# Patient Record
Sex: FEMALE | Race: ASIAN | NOT HISPANIC OR LATINO | URBAN - METROPOLITAN AREA
[De-identification: names, ages, dates, MRNs, and addresses within clinical notes are randomized per-mention and may not be internally consistent; named-entity substitution may affect disease eponyms.]

---

## 2017-06-13 ENCOUNTER — OUTPATIENT (OUTPATIENT)
Dept: OUTPATIENT SERVICES | Facility: HOSPITAL | Age: 57
LOS: 1 days | End: 2017-06-13
Payer: COMMERCIAL

## 2017-06-13 ENCOUNTER — APPOINTMENT (OUTPATIENT)
Dept: RADIOLOGY | Facility: HOSPITAL | Age: 57
End: 2017-06-13

## 2017-06-13 DIAGNOSIS — R76.11 NONSPECIFIC REACTION TO TUBERCULIN SKIN TEST WITHOUT ACTIVE TUBERCULOSIS: ICD-10-CM

## 2017-06-13 PROCEDURE — 71010: CPT | Mod: 26

## 2018-07-30 ENCOUNTER — TRANSCRIPTION ENCOUNTER (OUTPATIENT)
Age: 58
End: 2018-07-30

## 2018-08-22 ENCOUNTER — APPOINTMENT (OUTPATIENT)
Dept: INTERNAL MEDICINE | Facility: CLINIC | Age: 58
End: 2018-08-22
Payer: COMMERCIAL

## 2018-08-22 VITALS
TEMPERATURE: 98.1 F | BODY MASS INDEX: 18.89 KG/M2 | SYSTOLIC BLOOD PRESSURE: 130 MMHG | DIASTOLIC BLOOD PRESSURE: 80 MMHG | HEIGHT: 58 IN | WEIGHT: 90 LBS | HEART RATE: 74 BPM | OXYGEN SATURATION: 99 %

## 2018-08-22 DIAGNOSIS — Z86.19 PERSONAL HISTORY OF OTHER INFECTIOUS AND PARASITIC DISEASES: ICD-10-CM

## 2018-08-22 DIAGNOSIS — Z78.9 OTHER SPECIFIED HEALTH STATUS: ICD-10-CM

## 2018-08-22 DIAGNOSIS — Z80.42 FAMILY HISTORY OF MALIGNANT NEOPLASM OF PROSTATE: ICD-10-CM

## 2018-08-22 DIAGNOSIS — Z82.3 FAMILY HISTORY OF STROKE: ICD-10-CM

## 2018-08-22 DIAGNOSIS — Z80.0 FAMILY HISTORY OF MALIGNANT NEOPLASM OF DIGESTIVE ORGANS: ICD-10-CM

## 2018-08-22 DIAGNOSIS — Z87.19 PERSONAL HISTORY OF OTHER DISEASES OF THE DIGESTIVE SYSTEM: ICD-10-CM

## 2018-08-22 PROCEDURE — 93000 ELECTROCARDIOGRAM COMPLETE: CPT

## 2018-08-22 PROCEDURE — 99205 OFFICE O/P NEW HI 60 MIN: CPT

## 2018-08-22 PROCEDURE — 82270 OCCULT BLOOD FECES: CPT

## 2018-09-07 ENCOUNTER — TRANSCRIPTION ENCOUNTER (OUTPATIENT)
Age: 58
End: 2018-09-07

## 2018-09-21 LAB
25(OH)D3 SERPL-MCNC: 35.9 NG/ML
ALBUMIN SERPL ELPH-MCNC: 4.9 G/DL
ALP BLD-CCNC: 63 U/L
ALT SERPL-CCNC: 58 U/L
ANION GAP SERPL CALC-SCNC: 14 MMOL/L
APPEARANCE: CLEAR
AST SERPL-CCNC: 47 U/L
BASOPHILS # BLD AUTO: 0.03 K/UL
BASOPHILS NFR BLD AUTO: 0.7 %
BILIRUB SERPL-MCNC: 0.5 MG/DL
BILIRUBIN URINE: NEGATIVE
BLOOD URINE: NEGATIVE
BUN SERPL-MCNC: 11 MG/DL
CALCIUM SERPL-MCNC: 9.8 MG/DL
CHLORIDE SERPL-SCNC: 102 MMOL/L
CHOLEST SERPL-MCNC: 206 MG/DL
CHOLEST/HDLC SERPL: 3.5 RATIO
CO2 SERPL-SCNC: 25 MMOL/L
COLOR: YELLOW
CREAT SERPL-MCNC: 0.61 MG/DL
EOSINOPHIL # BLD AUTO: 0.12 K/UL
EOSINOPHIL NFR BLD AUTO: 2.6 %
GLUCOSE QUALITATIVE U: NEGATIVE MG/DL
GLUCOSE SERPL-MCNC: 99 MG/DL
HBV CORE IGG+IGM SER QL: NONREACTIVE
HBV SURFACE AB SER QL: REACTIVE
HBV SURFACE AG SER QL: NONREACTIVE
HCT VFR BLD CALC: 38 %
HCV AB SER QL: NONREACTIVE
HCV S/CO RATIO: 0.13 S/CO
HDLC SERPL-MCNC: 59 MG/DL
HEPATITIS A IGG ANTIBODY: NONREACTIVE
HGB BLD-MCNC: 12.4 G/DL
HIV1+2 AB SPEC QL IA.RAPID: NONREACTIVE
IMM GRANULOCYTES NFR BLD AUTO: 0 %
KETONES URINE: NEGATIVE
LDLC SERPL CALC-MCNC: 129 MG/DL
LEUKOCYTE ESTERASE URINE: NEGATIVE
LYMPHOCYTES # BLD AUTO: 1.72 K/UL
LYMPHOCYTES NFR BLD AUTO: 37.7 %
MAN DIFF?: NORMAL
MCHC RBC-ENTMCNC: 30.2 PG
MCHC RBC-ENTMCNC: 32.6 GM/DL
MCV RBC AUTO: 92.7 FL
MONOCYTES # BLD AUTO: 0.41 K/UL
MONOCYTES NFR BLD AUTO: 9 %
NEUTROPHILS # BLD AUTO: 2.28 K/UL
NEUTROPHILS NFR BLD AUTO: 50 %
NITRITE URINE: NEGATIVE
PH URINE: 6.5
PLATELET # BLD AUTO: 346 K/UL
POTASSIUM SERPL-SCNC: 3.8 MMOL/L
PROT SERPL-MCNC: 7.4 G/DL
PROTEIN URINE: NEGATIVE MG/DL
RBC # BLD: 4.1 M/UL
RBC # FLD: 13.6 %
SODIUM SERPL-SCNC: 141 MMOL/L
SPECIFIC GRAVITY URINE: 1.02
T PALLIDUM AB SER QL IA: NEGATIVE
TRIGL SERPL-MCNC: 89 MG/DL
UROBILINOGEN URINE: NEGATIVE MG/DL
VZV AB TITR SER: POSITIVE
VZV IGG SER IF-ACNC: 2212 INDEX
WBC # FLD AUTO: 4.56 K/UL

## 2018-09-23 ENCOUNTER — FORM ENCOUNTER (OUTPATIENT)
Age: 58
End: 2018-09-23

## 2018-09-23 LAB
M TB IFN-G BLD-IMP: POSITIVE
QUANTIFERON TB PLUS MITOGEN MINUS NIL: 8.54 IU/ML
QUANTIFERON TB PLUS NIL: 0.02 IU/ML
QUANTIFERON TB PLUS TB1 MINUS NIL: 0.54 IU/ML
QUANTIFERON TB PLUS TB2 MINUS NIL: 0.38 IU/ML

## 2018-09-24 ENCOUNTER — OUTPATIENT (OUTPATIENT)
Dept: OUTPATIENT SERVICES | Facility: HOSPITAL | Age: 58
LOS: 1 days | End: 2018-09-24
Payer: COMMERCIAL

## 2018-09-24 ENCOUNTER — APPOINTMENT (OUTPATIENT)
Dept: RADIOLOGY | Facility: CLINIC | Age: 58
End: 2018-09-24
Payer: COMMERCIAL

## 2018-09-24 ENCOUNTER — APPOINTMENT (OUTPATIENT)
Dept: MAMMOGRAPHY | Facility: CLINIC | Age: 58
End: 2018-09-24
Payer: COMMERCIAL

## 2018-09-24 PROCEDURE — 77080 DXA BONE DENSITY AXIAL: CPT | Mod: 26

## 2018-09-24 PROCEDURE — 77063 BREAST TOMOSYNTHESIS BI: CPT

## 2018-09-24 PROCEDURE — 77080 DXA BONE DENSITY AXIAL: CPT

## 2018-09-24 PROCEDURE — 77067 SCR MAMMO BI INCL CAD: CPT | Mod: 26

## 2018-09-24 PROCEDURE — 77063 BREAST TOMOSYNTHESIS BI: CPT | Mod: 26

## 2018-09-24 PROCEDURE — 77067 SCR MAMMO BI INCL CAD: CPT

## 2018-10-03 ENCOUNTER — FORM ENCOUNTER (OUTPATIENT)
Age: 58
End: 2018-10-03

## 2018-10-04 ENCOUNTER — APPOINTMENT (OUTPATIENT)
Dept: ULTRASOUND IMAGING | Facility: HOSPITAL | Age: 58
End: 2018-10-04
Payer: COMMERCIAL

## 2018-10-04 ENCOUNTER — OUTPATIENT (OUTPATIENT)
Dept: OUTPATIENT SERVICES | Facility: HOSPITAL | Age: 58
LOS: 1 days | End: 2018-10-04
Payer: COMMERCIAL

## 2018-10-04 PROCEDURE — 76700 US EXAM ABDOM COMPLETE: CPT

## 2018-10-04 PROCEDURE — 76700 US EXAM ABDOM COMPLETE: CPT | Mod: 26

## 2018-10-07 LAB
A1AT SERPL-MCNC: 106 MG/DL
ANA SER IF-ACNC: NEGATIVE
CERULOPLASMIN SERPL-MCNC: 23 MG/DL
COPPER SERPL-MCNC: 111 UG/DL
COPPER UR-MCNC: 12 MCG/G CR
DSDNA AB SER-ACNC: <12 IU/ML
ENA RNP AB SER IA-ACNC: <0.2 AL
ENA SM AB SER IA-ACNC: <0.2 AL
FERRITIN SERPL-MCNC: 334 NG/ML
LKM AB SER QL IF: 1.2 UNITS
MITOCHONDRIA AB SER IF-ACNC: NORMAL
SMOOTH MUSCLE AB SER QL IF: NORMAL

## 2018-10-08 ENCOUNTER — APPOINTMENT (OUTPATIENT)
Dept: INTERNAL MEDICINE | Facility: CLINIC | Age: 58
End: 2018-10-08
Payer: COMMERCIAL

## 2018-10-08 VITALS
SYSTOLIC BLOOD PRESSURE: 144 MMHG | HEART RATE: 67 BPM | WEIGHT: 91 LBS | HEIGHT: 58 IN | TEMPERATURE: 98 F | BODY MASS INDEX: 19.1 KG/M2 | DIASTOLIC BLOOD PRESSURE: 87 MMHG | OXYGEN SATURATION: 99 %

## 2018-10-08 PROCEDURE — 99215 OFFICE O/P EST HI 40 MIN: CPT

## 2018-10-08 NOTE — PHYSICAL EXAM
[Well Developed] : well developed [Well Nourished] : well nourished [Normal Voice Quality] : was normal [Normal Verbal Skills] : a deficiency in verbal communication skills was noted [Normal Nonverbal Skills] : deficient nonverbal communication skills were noted [Conjunctiva] : the conjunctiva were normal in both eyes [PERRL] : pupils were equal in size, round, and reactive to light [EOM Intact] : extraocular movements were intact [Normal Appearance] : was normal in appearance [Neck Supple] : was supple [No Tracheal Deviation] : the trachea was midline [Enlarged Diffusely] : was not enlarged [JVP Elevated ___cm] : the JVP was not elevated [Rate ___] : at [unfilled] breaths per minute [Normal Rhythm/Effort] : normal respiratory rhythm and effort [Clear Bilaterally] : the lungs were clear to auscultation bilaterally [Normal to Percussion] : the lungs were normal to percussion [Heart Rate ___] : [unfilled] bpm [Rt] : no varicose veins of the right leg [Lt] : no varicose veins of the left leg [Normal Rate] : normal [Normal S1] : normal S1 [Normal S2] : normal S2 [S3] : no S3 [S4] : no S4 [No Murmur] : no murmurs heard [No Pitting Edema] : no pitting edema present [Right Carotid Bruit] : no bruit heard over the right carotid [Left Carotid Bruit] : no bruit heard over the left carotid [Right Femoral Bruit] : no bruit heard over the right femoral artery [Left Femoral Bruit] : no bruit heard over the left femoral artery [2+] : left 2+ [No Abnormalities] : the abdominal aorta was not enlarged and no bruit was heard [Soft, Nontender] : the abdomen was soft and nontender [No Mass] : no masses were palpated [No HSM] : no hepatosplenomegaly noted [None] : no CVA tenderness [Cervical Lymph Nodes Enlarged Posterior Bilaterally] : nodes not enlarged [Supraclavicular Lymph Nodes Enlarged Bilaterally] : nodes not enlarged [Axillary Lymph Nodes Enlarged Bilaterally] : nodes not enlarged [Inguinal Lymph Nodes Enlarged Bilaterally] : nodes not enlarged [Normal Kyphosis] : normal kyphosis [No Visual Abnormalities] : no visible abnormalities [Normal Lordosis] : normal lordosis [No Scoliosis] : no scoliosis [No Tenderness to Palpation] : no spine tenderness on palpation [No Masses] : no masses [Full ROM] : full ROM [No Pain with ROM] : no pain with motion in any direction [Intact] : all reflexes within normal limits bilaterally [Normal Station and Gait] : the gait and station were normal [Normal Motor Tone] : the muscle tone was normal [Involuntary Movements] : no involuntary movements were seen [Abnormal Color] : normal color and pigmentation [Complexion Medium] : medium complexion [Skin Lesions 1] : no skin lesions were observed [Skin Turgor Decreased] : normal skin turgor [Normal] : normal texture and mobility [Normal Gait] : normal gait [Coordination Grossly Intact] : coordination grossly intact [No Focal Deficits] : no focal deficits [Deep Tendon Reflexes (DTR)] : deep tendon reflexes were 2+ and symmetric [Normal Mental Status] : the patient's orientation, memory, attention, language and fund of knowledge were normal [Appropriate] : appropriate [Impaired judgment] : intact judgment [Impaired Insight] : intact insight

## 2018-10-08 NOTE — ASSESSMENT
[High Risk Surgery - Intraperitoneal, Intrathoracic or Supringuinal Vascular Procedures] : High Risk Surgery - Intraperitoneal, Intrathoracic or Supringuinal Vascular Procedures - No (0) [Ischemic Heart Disease] : Ischemic Heart Disease - No (0) [Congestive Heart Failure] : Congestive Heart Failure - No (0) [Prior Cerebrovascular Accident or TIA] : Prior Cerebrovascular Accident or TIA - No (0) [Creatinine >= 2mg/dL (1 Point)] : Creatinine >= 2mg/dL - No (0) [Insulin-dependent Diabetic (1 Point)] : Insulin-dependent Diabetic - No (0) [0] : 0 , RCRI Class: I, Risk of Post-Op Cardiac Complications: 0.4%, Procedure Risk: Low-Risk [FreeTextEntry4] : Pt is having a low risk procedure and is low risks for cardiac adverse outcome and cri is 0.4% .  she was explained the procedures colonoscopy, anesthesia   risks and complications alternatives , prep and post procedure care.  I have answered all her questions.  She is medically cleared for  procedure .  \par elevated ferritin pt will be tested for hemochromatosis gene mutation and advised further when evalutated.

## 2018-10-08 NOTE — PLAN
[FreeTextEntry1] : gall bladder polypsGallbladder polyps are outgrowths of the gallbladder mucosal wall. They are usually found incidentally on ultrasonography or after cholecystectomy. When detected on ultrasonography, their clinical significance relates largely to their malignant potential. The majority of these lesions are not neoplastic but are hyperplastic or represent lipid deposits (cholesterolosis). Imaging studies alone are insufficiently specific to exclude the possibility of gallbladder carcinoma or premalignant adenomas. Furthermore, even benign lesions can occasionally lead to symptoms similar to those caused by gallbladder stones.\par \par While the widespread use of ultrasonography has made the diagnosis of polypoid lesions of the gallbladder increasingly frequent, optimal strategies for evaluating these lesions have not been fully established. This topic will review the clinical significance and differential diagnosis of gallbladder polyps, and will provide a practical approach to their management. Gallbladder cancer is discussed in detail elsewhere. (See "Gallbladder cancer: Epidemiology, risk factors, clinical features, and diagnosis".)\par \par EPIDEMIOLOGY — Gallbladder polyps have been observed in 0.004 to 13.8 percent of resected gallbladders [1], and in 1.5 to 4.5 percent of gallbladders assessed by ultrasonography [2,3]. In one report, no association was observed between the presence of polyps and the patient's age, sex, weight, number of pregnancies, use of exogenous female hormones, or any other risk factors that are generally believed to be associated with gallstones [3]. Gallbladder polyps have only rarely been described in children, in whom they occur either as a primary disorder or in association with other conditions, including metachromatic leukodystrophy, Peutz-Jeghers syndrome, or pancreatobiliary malunion [4]. (See "Gallstones: Epidemiology, risk factors and prevention".)\par \par CLASSIFICATION — The classification of gallbladder polyps was first proposed in 1970 based upon a review of 180 benign tumors [5]. As a general rule, polypoid lesions can be categorized as benign or malignant (table 1) [6,7]. Benign lesions have been further subdivided into neoplastic or non-neoplastic:\par \par ?The most common benign neoplastic lesion is an adenoma. Benign mesodermal tumors such as leiomyomas and lipomas are rare.\par \par \par ?The most common benign non-neoplastic lesions (pseudotumors) are cholesterol polyps (the presence of which is referred to as "cholesterolosis"), followed by adenomyomas (the presence of which is referred to as "adenomyomatosis"), and inflammatory polyps [1,5]. Cholesterolosis and adenomyomatosis are mucosal abnormalities of the gallbladder. They have been referred to as "hyperplastic cholecystosis," a term introduced in 1960 to differentiate them from inflammatory conditions such as acute cholecystitis, since they lack inflammatory features but exhibit features of hyperplasia [8]. Some data suggest that adenomyomatosis may be associated with an increased risk of gallbladder cancer. (See 'Adenomyomatosis' below.)\par \par \par ?The most common malignant lesion in the gallbladder is adenocarcinoma. Gallbladder adenocarcinomas are much more common than gallbladder adenomas, in contrast to the colon where adenomas are much more common than adenocarcinomas. Squamous cell carcinomas, mucinous cystadenomas, and adenoacanthomas of the gallbladder are rare.\par \par \par CHOLESTEROLOSIS AND CHOLESTEROL POLYPS — Cholesterolosis has been recognized since 1857, when Virchow described it in a report on the role of the gallbladder in fat metabolism [9]. Cholesterolosis is characterized by the accumulation of lipids in the mucosa of the gallbladder wall. It is a benign condition that is usually diagnosed incidentally during cholecystectomy or on ultrasonography. However, in some patients it can lead to symptoms and complications similar to those caused by gallstones.\par \par Epidemiology of cholesterolosis — Cholesterolosis is common; its prevalence in surgical studies varies from 9 to 26 percent [9]. A large autopsy series of 1300 cases found the prevalence to be 12 percent [10]. Cholesterolosis in association with gallstones is by far the most common pathologic finding in the gallbladder [3]. Most surgical series suggest risk factors that are similar to those for gallstone formation. However, as noted above, an ultrasound study showed no association with any of the known risk factors for gallstones [3]. (See 'Epidemiology' above.)\par \par Similarly, while gallstone disease is known to be more common in women, an autopsy series found the prevalence of cholesterolosis to be equal between men and women [10]. These contradictory observations may be explained by the observation that surgical series generally focus on gallbladders from patients who were symptomatic, which is not necessarily the case in autopsy or ultrasonographic studies.\par \par Pathology of cholesterolosis — Cholesterolosis results from abnormal deposits of triglycerides, cholesterol precursors, and cholesterol esters into the gallbladder mucosa. The lipid accumulation creates yellow deposits that are generally visible to the naked eye. The appearance of the yellow deposits on a background of hyperemic mucosa led to the description of this finding as a "strawberry gallbladder" (picture 1).\par \par The main microscopic feature is the presence of fat laden macrophages within elongated villi. Most of the lipid in the cytoplasm of the macrophages is in the form of liquid crystals, which are birefringent under polarized light microscopy, giving the macrophages a characteristic foamy appearance (picture 2).\par \par The hyperplastic villus is filled and distended with these cells, creating the small yellow nodules under the epithelium. In about two-thirds of cases, these nodules are less than 1 mm in diameter, which gives the mucosa the coarse and granular appearance that is characteristic of the diffuse or planar type of cholesterolosis. The nodules in the remaining one-third of cases are larger and polypoid in appearance (polypoid form) [10].\par \par In the polypoid form, the deposits give rise to solitary or multiple cholesterol polyps that are attached to the underlying mucosa with a fragile epithelial pedicle, the core of which is composed of lipid filled macrophages. These polyps can break off, leading to complications similar to those caused by small gallstones including biliary pain, pancreatitis, and obstructive jaundice.\par \par ADENOMYOMATOSIS — Adenomyomatosis is an abnormality of the gallbladder characterized by overgrowth of the mucosa, thickening of the muscle wall, and intramural diverticula. Despite the name, this condition does not involve any adenomatous changes in the gallbladder epithelium. While generally not considered to be a premalignant condition, adenomyomatosis is sometimes encountered in gallbladders resected for cancer, which has led to the suggestion that it is a premalignant condition [11]. However, the association and its magnitude remain unclear (see 'Adenomyomatosis and gallbladder cancer' below). There is, however, a clear association of adenomyomatosis with cholelithiasis, particularly with the segmental type, which was reported in 89 percent of patients with segmental adenomyomatosis in one series [12].\par \par Epidemiology of adenomyomatosis — Adenomyomatosis of the gallbladder is less common than cholesterolosis in most reports [13], with some exceptions [14]. In one report, for example, only 103 cases of adenomyomatosis were found in over 10,000 cholecystectomies (1 percent) [13]. This is compared with the much higher prevalence of cholesterolosis (9 to 26 percent) [9]. Adenomyomatosis is more common in women.\par \par Pathology of adenomyomatosis — The abnormality can be diffuse, segmental (annular), or localized to the fundus of the gallbladder.\par \par ?Diffuse adenomyomatosis causes thickening and irregularity of the mucosal surface and the muscle coat leading to cystic-like structures in the gallbladder wall or polypoid projections from the mucosa of the gallbladder. In the early phases, the intramural extension of the epithelium creates tubules and crypts in the lamina propria that accumulate mucous. Fluid filled mucosal pockets eventually herniate into the wall of the gallbladder and through the muscularis propria, forming cystic structures that are visible on gross inspection as pools of bile in the gallbladder wall (Rokitansky-Aschoff sinuses). The point of herniation may appear sealed due to hypertrophy of the muscularis.\par \par \par ?In the segmental type, a circumferential ring divides the gallbladder into separate interconnected compartments.\par \par \par ?In the localized type, the cystic structure forms a nodule, usually in the fundus, that projects into the lumen giving the appearance of a polyp on ultrasonography [11,14-16]. The muscle layer in the involved area is usually thickened to three to five times its usual thickness [11,15].\par \par \par Adenomyomatosis and gallbladder cancer — While generally not considered to be a premalignant condition, there have been several reports of gallbladder cancer arising in an area of segmental or fundal adenomyomatosis, but this association has not been a consistent finding in published series:\par \par In a series of 3197 consecutive, unselected cholecystectomies, gallbladder cancer was found in 6.4 percent of patients with segmental adenomyomatosis compared with 3.1 percent of all other cholecystectomies [17]. \par \par ?In a similar retrospective series of 4560 patients, gallbladder cancer was identified in 6.6 percent of patients with segmental adenomyomatosis versus 4.3 percent in those without (p = 0.049) [18]. \par \par \par ?On the other hand, in a series of 4704 cholecystectomies, adenomyomatosis was found in 2.4 percent, but no cancer was identified in any of these patients' resected gallbladders [19]. \par \par \par ?In a series of 30 patients with adenomyomatosis of the gallbladder identified over seven years and comprising 3.3 percent of all cholecystectomies in that center, there were no cases of gallbladder cancer in any of these patients, but there was an association with gallstones [20]. \par \par \par ?In a series of 1099 patients who underwent cholecystectomy, adenomyomatosis was found in 14.2 percent with a strong association with gallstones but with no cases of cancer diagnosed [12]. \par \par \par ?One series suggested that gallbladder cancer in patients with adenomyomatosis may be associated with more advanced cancer; in a series of 97 patients with gallbladder cancer, 25 percent were positive for adenomyomatosis, and in these patients there was an increased risk for a more advanced T stage, lymph node, and distant metastasis. The authors suggested that the presence of adenomyomatosis may prevent early detection of gallbladder cancer [21].\par \par \par At this time, there is no conclusive evidence that the presence of adenomyomatosis increases the risk of gallbladder cancer. If the risk is increased, the magnitude of the increased risk appears to be small (probably not more than twice the average risk). Finally, there is evidence that the presence of adenomyomatosis is associated with more advanced gallbladder cancer, possibly because its presence prevents early diagnosis of cancer on imaging studies.\par \par INFLAMMATORY POLYPS — Inflammatory polyps are the least common of the non-neoplastic polyps. They appear as either sessile or pedunculated on an ultrasonographic examination and are composed of granulation and fibrous tissue with plasma cells and lymphocytes. Polyps are usually 5 to 10 mm in diameter, although inflammatory polyps larger than 1 cm have been described. These large polyps can be confused with gallbladder carcinoma [22].\par \par ADENOMAS — Adenomatous polyps of the gallbladder are the most common benign tumors of the gallbladder but, unlike their colonic counterpart, are rare. Although the true incidence is unknown, in most series it is less than 0.5 percent. In one study, nine adenomatous polyps were identified among 2145 cholecystectomies (0.4 percent) [23].\par \par Pathology of adenomas — Adenomas of the gallbladder are benign epithelial tumors composed of cells resembling biliary tract epithelium. These lesions are classified into papillary and non-papillary types on histology [5]. A classification similar to that of intestinal polyps has been proposed in which the adenomas are divided into tubular, papillary, and mixed [24].\par \par The frequency of adenomas progressing to adenocarcinoma is unknown. In a series from [place], 18 patients with adenomas were identified among 1605 cholecystectomies. Of these, seven adenomas contained foci of carcinoma [25]. Similar to intestinal adenomas, the risk of malignancy is related to the size of the polyp. In the [place] series, none of the polyps less than 12 mm contained foci of malignancy.\par \par In another review from 15 [place] institutions, a 6 percent incidence of carcinoma was reported in lesions less than 1.0 cm, while the incidence was 37.5 percent in adenomas that were 1.0 to 2.0 cm [26]. In a third report in which nine adenomas were diagnosed among 2145 cholecystectomies, all of the nine polyps were less than 1 cm and none showed evidence for malignant changes [23]. (See "Gallbladder cancer: Epidemiology, risk factors, clinical features, and diagnosis", section on 'Molecular pathogenesis'.)\par \par MISCELLANEOUS POLYPS — Other benign neoplasms of the gallbladder, such as fibromas, lipomas, and leiomyomas, are extremely rare. The natural history of these polyps is not well defined, but is probably similar to their counterparts in other regions of the gastrointestinal tract.\par \par CLINICAL FEATURES — Polyps of the gallbladder are typically incidental findings detected during radiologic imaging of the abdomen. Their significance is related to their potential for malignancy. (See "Gallbladder cancer: Epidemiology, risk factors, clinical features, and diagnosis".)\par \par Regardless of their type or etiology, gallbladder polyps can be associated with biliary pain. Proposed mechanisms of pain include prolapse of the polyp into Zack's pouch, which, if it occurs during gallbladder ejection, can lead to biliary-type pain that subsides upon spontaneous reduction [27]. Another possible mechanism is that a detached portion of a polyp lying free in the gallbladder lumen can obstruct the cystic duct in much the same way a gallstone would, leading to biliary colic or cholecystitis [28]. The detached portion can also obstruct the common bile duct, leading to obstructive jaundice [29] and pancreatitis [30].\par \par In a review of 3,797 cholecystectomies, 55 cases of gallbladder cholesterolosis without cholelithiasis were identified. Twenty-seven of these patients presented with recurrent attacks of acute pancreatitis, which disappeared after cholecystectomy. The gallbladders had jerry cholesterolosis with a polypoid appearance. The authors postulated that detached cholesterol polyps temporarily impact at the sphincter of Oddi, leading to pancreatitis [30].\par \par In addition to biliary pain, cholesterolosis and adenomyomatosis have also been associated with chronic dyspeptic abdominal pain. In a study of 269 patients who underwent cholecystectomy and were found to have cholesterolosis, 96 percent had abdominal pain that was described as severe and in a majority, the pain had persisted for more than two years. Other symptoms reported in the same study were nausea and vomiting (61 percent) and dyspepsia (60 percent). Most of these symptoms resolved after cholecystectomy [31]. In another study, 35 of 55 patients with chronic abdominal pain underwent cholecystectomy; cholesterolosis was found in 20 patients, 19 of whom had improvement in symptoms [32].\par \par It has been suggested that polyps, cholesterolosis, and adenomyomatosis can lead to poor gallbladder emptying and compartmentalization that may be responsible for dyspeptic symptoms. However, the mechanism of these symptoms remains unclear since these observations have not been confirmed in other studies and the results of surgery are variable. Thus, it remains debatable whether these lesions can lead to chronic dyspeptic pain.\par \par RISK OF MALIGNANCY — The most useful predictive feature for malignancy is the size of the polyp. Polyps larger than 2 cm are almost always malignant and, in many cases, the cancer is advanced. Polyps 1 to 2 cm in size should be regarded as possibly malignant. Several pathologic studies support this, with the incidence of carcinoma being 43 to 77 percent in polyps larger than 1 cm [33] and 100 percent in polyps larger than 2 cm [34]. Age is another risk factor. Several studies demonstrated that age older than 50 to 60 years is associated with a higher risk for malignant polyps [35]. (See "Gallbladder cancer: Epidemiology, risk factors, clinical features, and diagnosis".)\par \par DIAGNOSIS — Advances in diagnostic imaging have resulted in improved sensitivity for the detection of gallbladder polyps. Although none of the available modalities can reliably predict polyp type, histology, or the presence of malignancy, a combination of features seen on an ultrasound, computed tomographic (CT) scan, and endoscopic ultrasound (EUS) can provide valuable information.\par \par Ultrasonography\par \par Conventional ultrasonography — Polyps are easily identified on ultrasonography as single or multiple echogenic foci. They can be easily differentiated from gallstones because they are fixed and do not move when the patient is rolled from one side to another. In addition, they do not cast a shadow (image 1). Ultrasonography can delineate other useful distinguishing characteristics in the appearance of polyps, including echogenicity, surface architecture, and the presence or absence of a pedicle.\par \par The following characteristics have been noted [36]:\par \par ?Cholesterol polyps are usually multiple, homogeneous, and pedunculated polypoid lesions that are more echogenic than the liver parenchyma (image 2). They may or may not contain hyperechoic spots and have a mulberry-like surface. Cholesterol polyps are usually smaller than 1 cm.\par \par \par ?Adenomas are homogeneous, are isoechoic with the liver parenchyma, have a smooth surface, and usually do not have a pedicle (image 3).\par \par \par ?Adenocarcinomas are homogeneous or heterogeneous polypoid structures that are usually isoechoic with the liver parenchyma and exhibit a mulberry-like surface [36].\par \par \par ?When located in the fundus, adenomyomatosis can produce a mucosal projection that can give the appearance of a polyp on ultrasonography (image 4). These polypoid lesions are about 10 to 20 mm in diameter [37].\par \par \par ?In patients with adenomyomatosis, ultrasonography shows non-specific focal thickening (>4 mm) of the gallbladder wall. Careful examination may predict the presence of adenomyomatosis by revealing diffuse or segmental thickening with round anechoic foci that represent the intramural diverticula. \par \par \par In a report that used these criteria, preoperative ultrasonography correctly identified 89 percent of cholesterol polyps, 81 percent of malignant polyps, 50 percent of adenomas, and 0 percent of inflammatory polyps (table 2) [36].\par \par In contrast to cholesterol polyps, diffuse cholesterolosis has no specific ultrasonographic finding. As a result, the diagnosis is usually made during surgery.\par \par Contrast enhanced ultrasonography — Contrast enhancement using microbubble forming agents injected into the peripheral circulation is widely used with echocardiography in the [place], but it is also used for diagnostic ultrasonography in [place] and [place] to differentiate benign from malignant tumors. A few small studies suggest that contrast-enhanced ultrasound may facilitate the detection of gallbladder polyps by helping to distinguish them from mural folds, gallbladder contents, or sludge and also to detect invasion into the liver and metastasis [38,39].\par \par Doppler ultrasonography — Several studies, primarily from [place], have demonstrated that the use of Doppler ultrasound may also help differentiate benign lesions from primary gallbladder malignancies. These reports demonstrated that the presence of color signal, the pattern of the signal, the resistive index, and particularly flow velocity help to identify primary malignancy of the gallbladder from benign lesions. In most of these studies, color Doppler was more useful than conventional abdominal ultrasound in diagnosing gallbladder lesions, mostly due to estimation of arterial flow velocity; however, there was overlap in velocities, and depending on the cut-off of the flow velocity used, it was not fully capable of identifying all malignant lesions, particularly early T1 lesions, perhaps related to low sensitivity in detecting fine blood flow in these lesions [40-46].\par \par High-resolution ultrasonography — High-resolution ultrasonography was found to have a diagnostic accuracy for gallbladder polyps comparable to EUS and superior to multidetector CT scan in one study [47].\par \par Oral cholecystography — Oral cholecystography (OCG) has fallen out of favor since ultrasonography is much more sensitive and specific. OCG requires a functioning gallbladder and a patent cystic duct to visualize the gallbladder. Polyps appear as immobile filling defects which are usually difficult to differentiate from gallstones. Adenomyomatosis has a characteristic appearance of an invagination in the wall that may occasionally show Rokitansky-Aschoff sinuses (image 5). (See "Overview of gallstone disease in adults", section on 'Other tests performed in selected patients'.)\par \par Computed tomography — CT scanning has low sensitivity for detecting small polyps. It is most useful in patients with gallbladder cancer since it can stage the disease by revealing liver invasion or metastasis. (See "Gallbladder cancer: Epidemiology, risk factors, clinical features, and diagnosis".)\par \par There are only limited data regarding the use of the CT scan in the evaluation of gallbladder polyps:\par \par ?One study noted 100 percent sensitivity of contrast enhanced CT for detecting gallbladder polyps in 20 patients who subsequently underwent cholecystectomy [48]. As in other studies, the size of the polyp was a predictor of malignancy. None of the six polyps less than 10 mm in diameter were neoplastic, while 5 of 14 polyps more than 10 mm in diameter were malignant and two were adenomas. Unenhanced CT detected four of five malignancies and both adenomas, whereas it missed all cholesterol and hyperplastic polyps. In addition, all cholesterol polyps were pedunculated, while most of the carcinomas were sessile (table 3). The authors concluded that pedunculated polyps smaller than 10 mm in diameter that are seen only on enhanced CT are very likely to be cholesterol polyps. \par \par \par ?Similar results were noted in a study involving 59 patients who underwent ultrasound and CT scanning prior to laparoscopic cholecystectomy [49]. In that study, 36 percent of cholesterol polyps were seen by ultrasound and not by CT.\par \par \par ?In a more recent study, 31 patients with 32 non-neoplastic polyps and 67 patients with 73 neoplastic polyps 10 mm or larger underwent unenhanced and dual-phase (arterial and portal venous phases) multi-detector row CT scan. Polyps larger than 15 mm, sessile polyps, and visualization on unenhanced images were associated with neoplastic polyps, differentiating them from non-neoplastic lesions [50]. However, multidetector CT scan was inferior to high-resolution ultrasound and EUS in detecting gallbladder polyps and predicting malignancy [47].\par \par \par A novel CT technique similar to CT colonography ("virtual colonoscopy") has been applied to the gallbladder. A  study suggested that it was more sensitive than standard CT and as sensitive as ultrasound for detecting gallbladder polyps [51]. It was more accurate than ultrasound in characterizing the three dimensional structure of the polyps. A clinical role for this technique remains to be defined.\par \par Endoscopic ultrasonography — EUS has been increasingly used to diagnose malignancies and undefined lesions of the gastrointestinal tract. It is regarded as the most sensitive test for gallbladder imaging because of the advantage of imaging the gallbladder through the gastric wall, without deleterious attenuation by subcutaneous fat or interference from intestinal gas, which may limit the usefulness of conventional transabdominal ultrasonography. These benefits render EUS a more accurate imaging modality than transabdominal ultrasonography for differentiating between cholesterol and neoplastic polyps [52-57]. However, the role of EUS in establishing a strategy for the management of gallbladder polyps is not well defined. \par \par One retrospective study attempted to define certain criteria for diagnosing cholesterol polyps, adenomyomatosis, and adenocarcinoma on EUS [52]. The presence of internal echo patterns characterized as tiny echogenic spots or an aggregation of multiple highly echogenic 1 to 3 mm spots, with or without echogenic areas, was considered diagnostic for cholesterol polyps. Adenomyomatosis (localized type) was associated with a sessile echogenic mass containing multiple microcysts (corresponding to the dilated Rokitansky-Aschoff sinuses) or a comet tail artifact. Lesions lacking these findings were found to be neoplastic (adenoma or adenocarcinoma). \par \par In a follow-up study by the same group using the same EUS criteria, a total of 194 patients with small (<20 mm) polypoid lesions underwent both transabdominal ultrasonography and EUS. Fifty-eight of these patients underwent surgery either because of symptoms or a suspicion of a neoplastic lesion on EUS. Using these criteria, EUS correctly predicted the histology in 97 percent of the patients compared with 76 percent for transabdominal ultrasonography (table 4) [53]. \par \par In other reports, investigators have relied on contour and internal echo patterns for differentiating neoplastic from non-neoplastic polyps [54,55]. In one study, the presence of hypoechoic foci on EUS was suggestive of adenomatous polyps, with a sensitivity and specificity of 90 and 89 percent, respectively. Contrast-enhanced harmonic EUS was used in two reports from [place] to differentiate between adenomatous and cholesterol polyps, with a reported sensitivity and specificity of 75 and 66 percent, respectively, with a slight increase in accuracy compared with conventional EUS [58,59]. \par \par Positron emission tomography (PET) — When gallbladder cancer is suggested by ultrasound findings, FDG-PET can help establish if the lesion is benign or malignant. However, it cannot be relied upon to exclude malignancy when ultrasound is highly suggestive of a neoplastic polyp [60].\par \par MANAGEMENT — The only effective treatment for gallbladder polyps or cholesterolosis is cholecystectomy, which should be considered in symptomatic patients or as prophylaxis to prevent malignant transformation. Optimal follow-up of patients who do not undergo cholecystectomy is unclear since there have been few studies and no controlled trials comparing cholecystectomy to observation [61]. However, a reasonable approach is suggested below.\par \par Although most gallbladder polyps are benign, the main objective is to exclude the presence of malignancy because advanced gallbladder cancer carries a poor prognosis and resection at an early stage offers the only hope for cure. What complicates matters is that none of the available imaging modalities can unequivocally distinguish neoplastic from non-neoplastic polyps. This can be achieved only by microscopic examination after surgery. Nevertheless, as discussed above, transabdominal ultrasonography and endoscopic ultrasonography can provide valuable information in the differential diagnosis of gallbladder polyps.\par \par Polyps along with gallstones or primary sclerosing cholangitis — Patients who have gallbladder polyps and concomitant gallstones should undergo cholecystectomy regardless of the polyp size or the presence of symptoms, since gallstones are a risk factor for gallbladder cancer in patients with gallbladder polyps [1,62,63]. \par \par Gallbladder mass lesions/polyps are frequently encountered in patients with primary sclerosing cholangitis (PSC) and exhibit a high rate of malignancy. In a study of 102 patients with PSC undergoing cholecystectomy, 14 percent had mass lesions, and 57 percent of these were adenocarcinomas [64]. The American and  associations for the study of liver disease suggest an annual ultrasound for patients with primary sclerosing cholangitis and cholecystectomy when gallbladder polyps are found, regardless of the size [65]. This, however, is challenged by observations that gallbladder cancer is seen only in polyps greater than 8 mm and that cholecystectomy in patients with PSC and cirrhosis is associated with high morbidity [66,67]. Nevertheless, there are reports of gallbladder cancer in PSC with polyps of 6 mm in size [68]. It is our approach to recommend a cholecystectomy for all patients with a polyp larger than 8 mm if they have PSC with cirrhosis and are good surgical candidates. If such patients have a less than 8 mm gallbladder polyp, we normally suggest close observation with an ultrasound evaluation every three to six months. However, for patients with PSC but without cirrhosis who are at average surgical risk, we consider a cholecystectomy for any polyp size. (See "Primary sclerosing cholangitis in adults: Clinical manifestations and diagnosis".)\par \par Adenomyomatosis — Based on case reports and retrospective series that suggest a possible increased risk of gallbladder cancer in patients with adenomyomatosis, some authors recommend considering cholecystectomy for patients with adenomyomatosis. However, as discussed above, the association between gallbladder malignancy and adenomyomatosis is doubtful. (See 'Adenomyomatosis and gallbladder cancer' above.)\par \par Furthermore, even if there is an increased risk of gallbladder cancer, the absolute risk for an individual patient remains small. Given the uncertainty and apparent small risk, we do not recommend cholecystectomy for patients with asymptomatic adenomyomatosis. While we do not have enough data to make preoperative predictions of which patients are at increased risk for cancer based on ultrasound or other imaging modalities, it should be kept in mind that the presence of adenomyomatosis may obscure an underlying cancer. In cases where there is concern that adenomyomatosis may be obscuring a malignancy, cholecystectomy is a reasonable alternative in patients who are fit for surgery.\par \par Symptomatic patients — Cholecystectomy is recommended for patients who have biliary colic or pancreatitis, since an appreciable proportion of such patients with cholesterolosis or adenomyomatosis improve after cholecystectomy [29,31,32,69]. On the other hand, patients with non-specific dyspeptic symptoms but without symptoms consistent with biliary colic should be managed conservatively (unless other indications for polyp removal are present) since the pathogenesis of these symptoms is unclear and cholecystectomy may not relieve the symptoms. Such patients should be treated symptomatically, as are other patients with chronic functional dyspepsia [32]. (See "Approach to the adult with dyspepsia".)\par \par Asymptomatic patients — Recommendations for patients who do not fall into any of the above categories depend upon the size of the polyps (algorithm 1).\par \par Lesions larger than 20 mm — Lesions larger than 20 mm in diameter are usually malignant and should be resected. Because these lesions may represent advanced cancer, patients should undergo preoperative staging with a computed tomographic (CT) scan and endoscopic ultrasound. An extended cholecystectomy with lymph node dissection and partial hepatic resection in the gallbladder bed is required when performing cholecystectomy for malignancy [36]. (See "Gallbladder cancer: Epidemiology, risk factors, clinical features, and diagnosis".)\par \par Lesions from 10 to 20 mm — Polyps 10 to 20 mm in diameter should be regarded as possibly malignant. Cancer of this size is usually at an early stage and laparoscopic cholecystectomy with full thickness dissection (removal of the entire connective tissue layers of the gallbladder bed to expose the liver surface) is recommended [36,69]. (See 'Risk of malignancy' above.)\par \par Lesions from 6 to 9 mm — Lesions 6 to 9 mm in diameter may represent cholesterol polyps, adenomas, or carcinomas. Multiple polyps, pedunculated polyps, and those that are hyperechoic compared with the liver are usually cholesterol polyps, while solitary and sessile polyps that are isoechoic with the liver are more likely to be neoplastic. (See 'Conventional ultrasonography' above.)\par \par The most reassuring finding is the stability

## 2018-10-08 NOTE — HISTORY OF PRESENT ILLNESS
[No Pertinent Cardiac History] : no history of aortic stenosis, atrial fibrillation, coronary artery disease, recent myocardial infarction, or implantable device/pacemaker [No Pertinent Pulmonary History] : no history of asthma, COPD, sleep apnea, or smoking [No Adverse Anesthesia Reaction] : no adverse anesthesia reaction in self or family member [Family Member] : no family member with adverse anesthesia reaction/sudden death [Self] : no previous adverse anesthesia reaction [Chronic Anticoagulation] : no chronic anticoagulation [Chronic Kidney Disease] : no chronic kidney disease [Diabetes] : no diabetes [(Patient denies any chest pain, claudication, dyspnea on exertion, orthopnea, palpitations or syncope)] : Patient denies any chest pain, claudication, dyspnea on exertion, orthopnea, palpitations or syncope [FreeTextEntry1] : colonoscopy [FreeTextEntry2] : 10/12/18 [FreeTextEntry3] : rigo [FreeTextEntry4] : pt is a 58 yr old woman who is having a colonoscopy.

## 2018-10-08 NOTE — RESULTS/DATA
[ECG Reviewed] : reviewed [Normal] : The 12 - lead ECG is normal [NSR] : normal sinus rhythm [Ventricular Rate___] : ventricular rate is [unfilled] beats per minute [P Waves Normal] : the P wave is normal [ECG Intervals NV.] : NV interval is normal [MD Interval___] : [unfilled] seconds [Normal QRS] : the QRS is normal [QRS Interval___] : QRS interval: [unfilled] seconds [ECG Axis] : the QRS axis is normal [QTc Interval___] : QTc interval: [unfilled] [Normal ST Segments] : the ST segments are normal [ECG T. Waves] : normal

## 2018-10-12 ENCOUNTER — RESULT REVIEW (OUTPATIENT)
Age: 58
End: 2018-10-12

## 2018-10-12 ENCOUNTER — APPOINTMENT (OUTPATIENT)
Dept: INTERNAL MEDICINE | Facility: HOSPITAL | Age: 58
End: 2018-10-12
Payer: COMMERCIAL

## 2018-10-12 ENCOUNTER — OUTPATIENT (OUTPATIENT)
Dept: OUTPATIENT SERVICES | Facility: HOSPITAL | Age: 58
LOS: 1 days | End: 2018-10-12
Payer: COMMERCIAL

## 2018-10-12 DIAGNOSIS — Z12.11 ENCOUNTER FOR SCREENING FOR MALIGNANT NEOPLASM OF COLON: ICD-10-CM

## 2018-10-12 PROCEDURE — ZZZZZ: CPT

## 2018-10-12 PROCEDURE — 45381 COLONOSCOPY SUBMUCOUS NJX: CPT | Mod: PT

## 2018-10-12 PROCEDURE — 45385 COLONOSCOPY W/LESION REMOVAL: CPT

## 2018-10-12 PROCEDURE — 45385 COLONOSCOPY W/LESION REMOVAL: CPT | Mod: PT

## 2018-10-12 PROCEDURE — C1889: CPT

## 2018-10-17 ENCOUNTER — LABORATORY RESULT (OUTPATIENT)
Age: 58
End: 2018-10-17

## 2018-10-24 ENCOUNTER — TRANSCRIPTION ENCOUNTER (OUTPATIENT)
Age: 58
End: 2018-10-24

## 2018-11-02 DIAGNOSIS — M81.0 AGE-RELATED OSTEOPOROSIS WITHOUT CURRENT PATHOLOGICAL FRACTURE: ICD-10-CM

## 2018-11-02 DIAGNOSIS — Z13.820 ENCOUNTER FOR SCREENING FOR OSTEOPOROSIS: ICD-10-CM

## 2018-11-02 DIAGNOSIS — Z12.31 ENCOUNTER FOR SCREENING MAMMOGRAM FOR MALIGNANT NEOPLASM OF BREAST: ICD-10-CM

## 2018-11-12 ENCOUNTER — APPOINTMENT (OUTPATIENT)
Dept: INTERNAL MEDICINE | Facility: CLINIC | Age: 58
End: 2018-11-12

## 2019-01-14 ENCOUNTER — APPOINTMENT (OUTPATIENT)
Dept: INTERNAL MEDICINE | Facility: CLINIC | Age: 59
End: 2019-01-14

## 2019-02-20 ENCOUNTER — APPOINTMENT (OUTPATIENT)
Dept: OBGYN | Facility: CLINIC | Age: 59
End: 2019-02-20
Payer: COMMERCIAL

## 2019-02-20 VITALS
TEMPERATURE: 97.9 F | OXYGEN SATURATION: 98 % | DIASTOLIC BLOOD PRESSURE: 59 MMHG | HEIGHT: 58 IN | HEART RATE: 77 BPM | WEIGHT: 97 LBS | SYSTOLIC BLOOD PRESSURE: 125 MMHG | BODY MASS INDEX: 20.36 KG/M2

## 2019-02-20 PROCEDURE — 99203 OFFICE O/P NEW LOW 30 MIN: CPT | Mod: 25

## 2019-02-22 LAB — HPV HIGH+LOW RISK DNA PNL CVX: NOT DETECTED

## 2019-02-25 LAB — CYTOLOGY CVX/VAG DOC THIN PREP: NORMAL

## 2019-03-05 ENCOUNTER — APPOINTMENT (OUTPATIENT)
Dept: INTERNAL MEDICINE | Facility: CLINIC | Age: 59
End: 2019-03-05

## 2019-03-12 ENCOUNTER — TRANSCRIPTION ENCOUNTER (OUTPATIENT)
Age: 59
End: 2019-03-12

## 2019-04-03 ENCOUNTER — FORM ENCOUNTER (OUTPATIENT)
Age: 59
End: 2019-04-03

## 2019-04-04 ENCOUNTER — OUTPATIENT (OUTPATIENT)
Dept: OUTPATIENT SERVICES | Facility: HOSPITAL | Age: 59
LOS: 1 days | End: 2019-04-04
Payer: COMMERCIAL

## 2019-04-04 ENCOUNTER — APPOINTMENT (OUTPATIENT)
Dept: ULTRASOUND IMAGING | Facility: HOSPITAL | Age: 59
End: 2019-04-04
Payer: COMMERCIAL

## 2019-04-04 PROCEDURE — 76700 US EXAM ABDOM COMPLETE: CPT | Mod: 26

## 2019-04-04 PROCEDURE — 76700 US EXAM ABDOM COMPLETE: CPT

## 2019-04-15 ENCOUNTER — APPOINTMENT (OUTPATIENT)
Dept: INTERNAL MEDICINE | Facility: CLINIC | Age: 59
End: 2019-04-15
Payer: COMMERCIAL

## 2019-04-15 VITALS
HEIGHT: 58 IN | BODY MASS INDEX: 19.31 KG/M2 | TEMPERATURE: 97.9 F | WEIGHT: 92 LBS | SYSTOLIC BLOOD PRESSURE: 149 MMHG | HEART RATE: 73 BPM | DIASTOLIC BLOOD PRESSURE: 81 MMHG

## 2019-04-15 DIAGNOSIS — K57.90 DIVERTICULOSIS OF INTESTINE, PART UNSPECIFIED, W/OUT PERFORATION OR ABSCESS W/OUT BLEEDING: ICD-10-CM

## 2019-04-15 DIAGNOSIS — Z12.11 ENCOUNTER FOR SCREENING FOR MALIGNANT NEOPLASM OF COLON: ICD-10-CM

## 2019-04-15 DIAGNOSIS — Z01.818 ENCOUNTER FOR OTHER PREPROCEDURAL EXAMINATION: ICD-10-CM

## 2019-04-15 PROCEDURE — 99212 OFFICE O/P EST SF 10 MIN: CPT

## 2019-04-15 RX ORDER — POLYETHYLENE GLYCOL 3350 17 G/17G
17 POWDER, FOR SOLUTION ORAL
Qty: 1 | Refills: 0 | Status: COMPLETED | COMMUNITY
Start: 2018-10-08 | End: 2019-04-15

## 2019-04-15 RX ORDER — BISACODYL 5 MG/1
5 TABLET, COATED ORAL
Qty: 4 | Refills: 0 | Status: COMPLETED | COMMUNITY
Start: 2018-10-08 | End: 2019-04-15

## 2019-04-15 NOTE — ASSESSMENT
[FreeTextEntry1] : gall bladder polyps stable will repeat  in 6 months.  Elevated ferritin level -  She has stopped drinking the  blood dish and will have repeat done.  She had elevated liver enzymes and her liver appears normal on sonogram. She would like me to be her pcp and will make appt for fu.  diverticulosis A diverticulum is a pouch-like structure that can form through points of weakness in the muscular wall of the colon (ie, at points where blood vessels pass through the wall) (figure 1).\par \par Diverticulosis affects men and women equally. The risk of diverticular disease increases with age. It occurs throughout the world but is seen more commonly in developed countries.\par \par \par WHAT IS DIVERTICULAR DISEASE?\par \par Diverticulosis — Diverticulosis merely describes the presence of diverticula. Diverticulosis is often found during a test done for other reasons, such as flexible sigmoidoscopy, colonoscopy, or barium enema. Most people with diverticulosis have no symptoms and will remain symptom free for the rest of their lives. (See 'Diverticular disease prognosis' below.)\par \par A person with diverticulosis may have diverticulitis, or diverticular bleeding.\par \par Diverticulitis — Inflammation of a diverticulum (diverticulitis) occurs when there is thinning and breakdown of the diverticular wall. This may be caused by increased pressure within the colon or by hardened particles of stool, which can become lodged within the diverticulum.\par \par The symptoms of diverticulitis depend upon the degree of inflammation present. The most common symptom is pain in the left lower abdomen. Other symptoms can include nausea and vomiting, constipation, diarrhea, and urinary symptoms such as pain or burning when urinating or the frequent need to urinate.\par \par Diverticulitis is divided into simple and complicated forms.\par \par ?Simple diverticulitis, which accounts for 75 percent of cases, is not associated with complications and typically responds to medical treatment without surgery.\par \par \par ?Complicated diverticulitis occurs in 25 percent of cases and usually requires surgery. Complications associated with diverticulitis can include the following:\par \par \par •Abscess – a localized collection of pus\par \par •Fistula – an abnormal tract between two areas that are not normally connected (eg, bowel and bladder)\par \par •Obstruction – a blockage of the colon\par \par •Peritonitis – infection involving the space around the abdominal organ\par \par •Sepsis – overwhelming body-wide infection that can lead to failure of multiple organs\par \par \par Diverticular bleeding — Diverticular bleeding occurs when a small artery located within a diverticulum is eroded and bleeds into the colon.\par \par Diverticular bleeding usually causes painless bleeding from the rectum. In approximately 50 percent of cases, the person will see maroon or bright red blood with bowel movements.\par \par Is bleeding with a bowel movement normal? — It is not normal to see blood in a bowel movement; this can be a sign of several conditions, most of which are not serious (eg, hemorrhoids) but some of which are serious and require immediate treatment. Anyone who sees blood after a bowel movement should consult with their healthcare provider to determine if further testing or evaluation is needed. (See "Patient education: Blood in the stool (rectal bleeding) in adults (Beyond the Basics)".)\par \par \par DIVERTICULOSIS AND DIVERTICULITIS DIAGNOSIS\par Diverticulosis is often found during tests performed for other reasons. \par \par ?Barium enema – This is an x-ray study that uses barium in an enema to view the outline of the lower intestinal tract. This is an older test and has been largely replaced by computed tomography (CT) scan.\par \par \par ?Flexible sigmoidoscopy – This is an examination of the inside of the sigmoid colon with a thin, flexible tube that contains a camera. (See "Patient education: Flexible sigmoidoscopy (Beyond the Basics)".)\par \par \par ?Colonoscopy – This is an examination of the inside of the entire colon. (See "Patient education: Colonoscopy (Beyond the Basics)".)\par \par \par ?CT scan – A CT scan is often used to diagnose diverticulitis and its complications. If diverticulitis (not just diverticulosis) is suspected, the above three tests should not be used because of the risk of perforation.\par \par \par \par TREATMENT\par \par Diverticulosis — People with diverticulosis who do not have symptoms do not require treatment. However, most clinicians recommend increasing fiber in the diet, which can help to bulk the stools and possibly prevent the development of new diverticula, diverticulitis, or diverticular bleeding. Fiber is not proven to prevent these conditions in all patients but may help to control recurrent episodes in some.\par \par Increase fiber — Fruits and vegetables are a good source of fiber (table 1). The fiber content of packaged foods can be calculated by reading the nutrition label (figure 2). (See "Patient education: High-fiber diet (Beyond the Basics)".)\par \par Seeds and nuts — Patients with diverticular disease have historically been advised to avoid whole pieces of fiber (such as seeds, corn, and nuts) because of concern that these foods could cause an episode of diverticulitis. However, this belief is completely unproven. We do not suggest that patients with diverticulosis avoid seeds, corn, or nuts.\par \par Diverticulitis — Treatment of diverticulitis depends upon how severe your symptoms are.\par \par Home treatment — If you have mild symptoms of diverticulitis (mild abdominal pain, usually left lower abdomen), you can be treated at home with a clear liquid diet and oral antibiotics. However, if you develop one or more of the following signs or symptoms, you should seek immediate medical attention:\par \par ?Temperature >100.1°F (38°C)\par \par ?Worsening or severe abdominal pain\par \par ?An inability to tolerate fluids\par \par \par Hospital treatment — If you have moderate to severe symptoms, you may be hospitalized for treatment. During this time, you are not allowed to eat or drink; antibiotics and fluids are given into a vein.\par \par If you develop an abscess of the colon, you may require drainage of the abscess (usually performed by placing a drainage tube across the abdominal wall) or by surgically opening the affected area.\par \par Surgery — If you develop a generalized infection in the abdomen (peritonitis), you will usually require an emergency operation. A two-part operation may be necessary in some cases.\par \par ?The first operation involves removal of the diseased colon and creation of a colostomy. A colostomy is an opening between the colon and the skin, where a bag is attached to collect waste from the intestine. The lower end of the colon is temporarily sewed closed to allow it to heal (figure 3).\par \par \par ?Approximately three to six months later, a second operation is performed to reconnect the two parts of the colon and close the opening in the skin. You are then able to empty your bowels through the rectum. Sometimes patients require up to a year to recover from the first operation, depending on how sick they were.\par \par \par In non-emergency situations, the diseased area of the colon can be removed and the two ends of the colon can be reconnected in one operation, without the need for a colostomy.\par \par Surgery versus medical therapy — An operation to remove the diseased area of the colon may be necessary if you do not improve with medical therapy. After an episode of uncomplicated diverticulitis, elective surgery is generally not required as the risk of another attack or requiring emergency surgery is low. However, patients with persistent symptoms attributable to diverticulitis, a history of complicated diverticulitis, or a compromised immune system should be evaluated for possible surgery to prevent another attack. In such patients, another attack has been associated with a higher risk of complications or death. Of course, the decision will also depend in part upon your other medical conditions and ability to undergo surgery.\par \par In many cases, an elective operation can be performed laparoscopically, using small incisions, rather than the typical vertical (up and down) abdominal incision. Laparoscopic surgery usually allows you to recover more quickly and shortens the hospital stay.\par \par After diverticulitis resolves — After an episode of diverticulitis resolves, if you have not had a recent colonoscopy, the entire length of the colon should be evaluated to determine the extent of disease and to rule out the presence of abnormal lesions such as polyps or cancer. Recommended tests include colonoscopy, barium enema and sigmoidoscopy, or CT colonography. (See "Patient education: Colon and rectal cancer screening (Beyond the Basics)".)\par \par Diverticular bleeding — Most cases of diverticular bleeding resolve on their own. However, some people will need further testing or treatment to stop bleeding, which may include a colonoscopy, angiography (a treatment that blocks off the bleeding artery), bleeding scan, or surgery.\par \par \par DIVERTICULAR DISEASE PROGNOSIS\par \par Diverticulosis — Over time, diverticulosis may cause no problems or it may cause episodes of bleeding and/or diverticulitis. Approximately 15 to 25 percent of people with diverticulosis will develop diverticulitis, while 5 to 15 percent will develop diverticular bleeding.\par \par Diverticulitis — Approximately 85 percent of people with uncomplicated diverticulitis will respond to medical treatment, while approximately 15 percent of patients will need an operation. After successful treatment for a first attack of diverticulitis, one-third of patients will remain asymptomatic, one-third will have episodic cramps without diverticulitis, and one-third will go on to have a second attack of diverticulitis.\par \par The prognosis tends to remain similar following a second attack of diverticulitis. Only 10 percent of people remain symptom-free after a second attack. Subsequent attacks tend to be of similar severity, not increasing in severity as previously believed\par Adenomas are the most prevalent neoplastic polyps in the colon. \par \par Epidemiology and risk factors — Approximately two-thirds of all colonic polyps are adenomas. Thirty to fifty percent of colons with one adenoma will contain at least one other synchronous adenoma [41]. \par \par Increasing age is a risk factor for the development of colonic adenomas and is associated with the development of high-grade dysplasia within an adenoma, independent of size and histology [19,42]. In colorectal cancer screening studies, the prevalence of adenomas is approximately 25 to 30 percent at age 50 years [42-46]. Autopsy studies have found rates as high as 50 percent by age 70 years (figure 1) but only 1 to 4 percent in those in their twenties or thirties [46,47]. Advancing age is also a risk factor for right-sided polyps [48]. \par \par An increased body mass index (BMI) is associated with an increased risk of colorectal adenomas. In a meta-analysis of 36 studies, the risk for colorectal adenomas increased by 19 percent for every 5-unit increase in BMI (summary relative risk 1.19; 95% CI 1.13-1.26) [49]. Abdominal obesity, measured by increasing abdominal visceral adipose tissue volume, may be a better predictor than BMI or waist circumference in both sexes [50]. Lack of physical activity is also a risk factor [51]. \par \par Adenomatous polyps are more common in men, and large adenomas may be more common in -Americans as compared with other ethnic groups [45,52,53]. In addition, -Americans may have a higher risk of right-sided colonic adenomas and present with colorectal at a younger age (<50 years of age) [54,55]. (See "Colorectal cancer: Epidemiology, risk factors, and protective factors" and "Screening for colorectal cancer: Strategies in patients at average risk".)\par \par Clinical features\par \par Clinical presentation and natural history — Adenomas are generally asymptomatic and are most often detected by colon cancer screening tests. Small adenomas do not typically bleed. The growth rates of adenomatous polyps are variable and do not follow a consistent linear trend. The majority of small polyps exhibit minimal growth (averaging 0.5 mm/year). However, complete regression is uncommon [56]. Only a small minority of adenomas progress to cancer (5 percent or less) over 7 to 10 years. The risk of progression is higher for advanced adenomas (adenoma with high-grade dysplasia, >10 mm in size, or a villous component) [42]. \par \par Approximately 5 to 7 percent of patients with adenomas have high-grade dysplasia, and 3 to 5 percent have invasive carcinoma at the time of diagnosis. The proportion of adenomas showing advanced histologic features increases with polyp size from approximately 1 to 2 percent in small adenomas (<5 mm) to 7 to 12 percent for medium-sized adenomas (5 to 10 mm) and 20 to 30 percent for large adenomas (>1 cm) [57-59].\par \par Endoscopic features and classification — The majority of adenomas (60 to 75 percent) are less than 1 cm at endoscopy [19,60]. Based on their gross appearance, adenomas may be pedunculated, sessile, flat, depressed, or excavated. \par \par ?Sessile – In a sessile polypoid lesion, the base and the top of the lesion have the same diameter. Large, laterally-spreading adenomas may have a granular appearance, which is characteristic of being benign. If the mucosa is smooth and non-granular, with distortion of the microvasculature as seen in high-definition endoscopy, cancer should be suspected.\par \par \par ?Pedunculated – In pedunculated polyp the base is narrow. A mucosal stalk is interposed between the polyp and the wall (picture 6). \par \par \par ?Flat – Flat lesions are defined as those with a height less than one-half the diameter of the lesion. Up to 27 to 36 percent of adenomas are relatively flat [61-64]. Flat lesions can be difficult to detect on colonoscopy. Some studies have found that flat adenomas tend to be relatively advanced histologically for their size compared with polypoid lesions [62,63,65]. However, their natural history is poorly understood [64]. \par \par \par ?Depressed – In depressed lesions the entire thickness of the mucosa in the lesion is often less than that of the adjacent mucosa. Depressed lesions appear to be particularly likely to harbor high-grade dysplasia or be malignant, even if small [65-70]. Up to 1 percent of adenomas are depressed [61-64]. Although flat and depressed adenomas were initially thought to be largely in  populations, they have now been shown to occur more frequently than previously recognized in western populations [65,71-74].\par \par \par The Lilian classifications of superficial neoplastic lesions of the gastrointestinal tract can be used to classify adenomas into polypoid and nonpolypoid lesions (figure 2 and table 3) [75]. \par \par Endoscopic features suggestive of invasive cancer include friability, induration, and ulceration. On probing, a firm consistency or adherence of the polyp to the underlying tissue is also suggestive of malignancy. (See "Endoscopic removal of large colon polyps", section on 'Features suggesting invasive cancer'.)\par \par Histologic features — Adenomas are characterized as tubular, villous, or a mixture of the two [19]. An advanced adenoma is defined as adenomas =10 mm in size or with villous components or high-grade dysplasia. \par \par ?Tubular – Tubular adenomas account for more than 80 percent of colonic adenomas. They are characterized by a network of branching adenomatous epithelium (picture 7A-B). To be classified as tubular, the adenoma should have a tubular component of at least 75 percent.\par \par \par ?Villous – Adenomas with >75 percent villous features are termed villous adenomas. Villous adenomas account for 5 to 15 percent of adenomas (image 1 and picture 8). They are characterized by glands that are long and extend straight down from the surface to the center of the polyp (picture 9). To be classified as villous, the adenoma should have a villous component of at least 75 percent.\par \par \par ?Tubulovillous – Adenomas with 25 to 75 percent villous features are considered to be tubulovillous. Tubulovillous adenomas account for 5 to 15 percent of colonic adenomas.\par \par \par Some degree of dysplasia exists in all adenomas. Based on the degree of dysplasia, polyps are classified as having:\par \par ?Low-grade dysplasia \par \par ?High-grade dysplasia \par \par \par High-grade dysplasia (synonymous with intraepithelial carcinoma) represents an intermediate step in the progression from a low-grade dysplasia to cancer. The term is applied to lesions that are confined to the epithelial layer of crypts and lack invasion through the basement membrane into the lamina propria. As there are no lymphatic vessels in the lamina propria, lesions with high grade dysplasia are not associated with metastasis [76,77]. \par \par In carcinoma in situ (Tis), or intramucosal adenocarcinoma, cancer cells invade into the lamina propria and may involve but do not penetrate the muscularis mucosa [19,78]. Many pathologists will not differentiate these features from high-grade dysplasia, especially as there is no need to either from a biological and management perspective. Invasive adenocarcinoma extends through the muscularis mucosa into the submucosa and beyond.\par \par Management\par \par Polypectomy — Adenomas should be resected completely. Small adenomas may be completely removed using biopsy forceps, while larger adenomas require snare resection, with or without electrocautery or advanced endoscopic resection techniques (eg, endoscopic mucosal resection or endoscopic submucosal dissection). Cold snaring has the advantage of avoiding full thickness injury to the bowel wall that can accompany diathermy, and minimizes delayed post-polypectomy bleeding. Large sessile adenomas often require piecemeal resection. In cases where endoscopic resection is not possible, surgical resection is required. (See "Endoscopic removal of large colon polyps".)\par \par

## 2019-04-15 NOTE — HISTORY OF PRESENT ILLNESS
[Nausea] : denies nausea [Heartburn] : denies heartburn [Yellow Skin Or Eyes (Jaundice)] : denies jaundice [Vomiting] : denies vomiting [Constipation] : denies constipation [Diarrhea] : denies diarrhea [Abdominal Swelling] : denies abdominal swelling [Abdominal Pain] : denies abdominal pain [Rectal Pain] : denies rectal pain [GERD] : no gastroesophageal reflux disease [de-identified] : Pt had repeat sonogram and has stable gb polyps largest stable 7mm .  She is otherwise feeling well. She had three polyps removed and flat and will have fu colonoscopy in 3 yrs .  She also has diverticuli.

## 2019-04-15 NOTE — PHYSICAL EXAM
[General Appearance - In No Acute Distress] : in no acute distress [General Appearance - Alert] : alert [PERRL With Normal Accommodation] : pupils were equal in size, round, and reactive to light [Oropharynx] : the oropharynx was normal [Auscultation Breath Sounds / Voice Sounds] : lungs were clear to auscultation bilaterally [Heart Rate And Rhythm] : heart rate was normal and rhythm regular [Heart Sounds] : normal S1 and S2 [Heart Sounds Gallop] : no gallops [Heart Sounds Pericardial Friction Rub] : no pericardial rub [Full Pulse] : the pedal pulses are present [Edema] : there was no peripheral edema [Bowel Sounds] : normal bowel sounds [Abdomen Soft] : soft [Abdomen Tenderness] : non-tender [Cervical Lymph Nodes Enlarged Posterior Bilaterally] : posterior cervical [Abdomen Mass (___ Cm)] : no abdominal mass palpated [Cervical Lymph Nodes Enlarged Anterior Bilaterally] : anterior cervical [Supraclavicular Lymph Nodes Enlarged Bilaterally] : supraclavicular [Axillary Lymph Nodes Enlarged Bilaterally] : axillary [Inguinal Lymph Nodes Enlarged Bilaterally] : inguinal [Femoral Lymph Nodes Enlarged Bilaterally] : femoral [No CVA Tenderness] : no ~M costovertebral angle tenderness [No Spinal Tenderness] : no spinal tenderness [Abnormal Walk] : normal gait [Nail Clubbing] : no clubbing  or cyanosis of the fingernails [Motor Tone] : muscle strength and tone were normal [Musculoskeletal - Swelling] : no joint swelling seen [Skin Color & Pigmentation] : normal skin color and pigmentation [Skin Turgor] : normal skin turgor [] : no rash [Deep Tendon Reflexes (DTR)] : deep tendon reflexes were 2+ and symmetric [Sensation] : the sensory exam was normal to light touch and pinprick [No Focal Deficits] : no focal deficits [Oriented To Time, Place, And Person] : oriented to person, place, and time [Impaired Insight] : insight and judgment were intact [Affect] : the affect was normal

## 2019-04-17 LAB
ALBUMIN SERPL ELPH-MCNC: 4.6 G/DL
ALP BLD-CCNC: 51 U/L
ALT SERPL-CCNC: 24 U/L
ANION GAP SERPL CALC-SCNC: 15 MMOL/L
AST SERPL-CCNC: 26 U/L
BILIRUB SERPL-MCNC: 0.6 MG/DL
BUN SERPL-MCNC: 16 MG/DL
CALCIUM SERPL-MCNC: 9.4 MG/DL
CHLORIDE SERPL-SCNC: 105 MMOL/L
CO2 SERPL-SCNC: 26 MMOL/L
CREAT SERPL-MCNC: 0.59 MG/DL
FERRITIN SERPL-MCNC: 316 NG/ML
GLUCOSE SERPL-MCNC: 94 MG/DL
POTASSIUM SERPL-SCNC: 4.3 MMOL/L
PROT SERPL-MCNC: 6.9 G/DL
SODIUM SERPL-SCNC: 146 MMOL/L

## 2019-10-07 ENCOUNTER — FORM ENCOUNTER (OUTPATIENT)
Age: 59
End: 2019-10-07

## 2019-10-08 ENCOUNTER — OUTPATIENT (OUTPATIENT)
Dept: OUTPATIENT SERVICES | Facility: HOSPITAL | Age: 59
LOS: 1 days | End: 2019-10-08
Payer: COMMERCIAL

## 2019-10-08 ENCOUNTER — APPOINTMENT (OUTPATIENT)
Dept: ULTRASOUND IMAGING | Facility: HOSPITAL | Age: 59
End: 2019-10-08
Payer: COMMERCIAL

## 2019-10-08 PROCEDURE — 76700 US EXAM ABDOM COMPLETE: CPT

## 2019-10-08 PROCEDURE — 76700 US EXAM ABDOM COMPLETE: CPT | Mod: 26

## 2019-11-21 ENCOUNTER — APPOINTMENT (OUTPATIENT)
Dept: INTERNAL MEDICINE | Facility: CLINIC | Age: 59
End: 2019-11-21
Payer: COMMERCIAL

## 2019-11-21 ENCOUNTER — LABORATORY RESULT (OUTPATIENT)
Age: 59
End: 2019-11-21

## 2019-11-21 VITALS
TEMPERATURE: 97.3 F | SYSTOLIC BLOOD PRESSURE: 124 MMHG | HEIGHT: 58 IN | HEART RATE: 87 BPM | WEIGHT: 93 LBS | OXYGEN SATURATION: 98 % | DIASTOLIC BLOOD PRESSURE: 77 MMHG | BODY MASS INDEX: 19.52 KG/M2

## 2019-11-21 PROCEDURE — 99396 PREV VISIT EST AGE 40-64: CPT

## 2019-11-21 NOTE — HISTORY OF PRESENT ILLNESS
[FreeTextEntry1] : cpe and transition her medical care for me.   [de-identified] : Pt is a 59 yr old woman with family hx of colon cancer and has tubular adenoma who is here to have cpe.  She has hx of gallbladder polyp as well.   Pt states she recently developed dry cough for the last two days.  No feer or chills sob body aches or nasal congestion or ear pain.  her mom had colon cancer in her 80s and maternal uncle in his 70s but both were stage 4 when found.

## 2019-11-21 NOTE — HEALTH RISK ASSESSMENT
[Good] : ~his/her~  mood as  good [No] : No [No falls in past year] : Patient reported no falls in the past year [0] : 2) Feeling down, depressed, or hopeless: Not at all (0) [Patient reported mammogram was normal] : Patient reported mammogram was normal [Patient reported PAP Smear was normal] : Patient reported PAP Smear was normal [Patient reported colonoscopy was abnormal] : Patient reported colonoscopy was abnormal [HIV Test offered] : HIV Test offered [None] : None [With Family] : lives with family [# of Members in Household ___] :  household currently consist of [unfilled] member(s) [Employed] : employed [College] : College [] :  [# Of Children ___] : has [unfilled] children [Sexually Active] : sexually active [Feels Safe at Home] : Feels safe at home [Fully functional (bathing, dressing, toileting, transferring, walking, feeding)] : Fully functional (bathing, dressing, toileting, transferring, walking, feeding) [Fully functional (using the telephone, shopping, preparing meals, housekeeping, doing laundry, using] : Fully functional and needs no help or supervision to perform IADLs (using the telephone, shopping, preparing meals, housekeeping, doing laundry, using transportation, managing medications and managing finances) [Smoke Detector] : smoke detector [Carbon Monoxide Detector] : carbon monoxide detector [Safety elements used in home] : safety elements used in home [Seat Belt] :  uses seat belt [Sunscreen] : uses sunscreen [FreeTextEntry1] : none  [] : No [de-identified] : walking  [de-identified] : none  [[id number]] : 0 [Change in mental status noted] : No change in mental status noted [Language] : denies difficulty with language [Behavior] : denies difficulty with behavior [Learning/Retaining New Information] : denies difficulty learning/retaining new information [Handling Complex Tasks] : denies difficulty handling complex tasks [Reasoning] : denies difficulty with reasoning [Spatial Ability and Orientation] : denies difficulty with spatial ability and orientation [Reports changes in hearing] : Reports no changes in hearing [Reports changes in vision] : Reports no changes in vision [Reports changes in dental health] : Reports no changes in dental health [Guns at Home] : no guns at home [Travel to Developing Areas] : does not  travel to developing areas [TB Exposure] : is not being exposed to tuberculosis [Caregiver Concerns] : does not have caregiver concerns [MammogramDate] : 9/18 [PapSmearDate] : 2/19 [BoneDensityDate] : 9/18 [BoneDensityComments] : osteoporosis of left hip femoral neck and lumbar spine  [ColonoscopyDate] : 10/12  [ColonoscopyComments] : tubular adenoma  [HIVDate] : 9/18 [HepatitisCDate] : 9/18 [FreeTextEntry2] : nurse  [de-identified] : last exam 10/2019 [AdvancecareDate] : 11/19

## 2019-11-21 NOTE — PLAN
[FreeTextEntry1] :  Alendronate Sodium 35 MG Oral Tablet  GENERIC NAME:  Alendronate Tablets (blanquita ZACHARY gonzalezdarío luciano)   COMMON USES:  It is used to prevent or treat soft, brittle bones (osteoporosis). It is used to treat Paget's disease. It may be given to you for other reasons. Talk with the doctor.   HOW TO USE THIS MEDICINE:  HOW IS THIS DRUG BEST TAKEN? Use this drug as ordered by your doctor. Read all information given to you. Follow all instructions closely. Take on an empty stomach before breakfast. Take at least 30 minutes before the first food, drink, or drugs of the day. Do not lie down for at least 30 minutes after taking this drug. Keep taking this drug as you have been told by your doctor or other health care provider, even if you feel well. Take with a full glass of water. Take with plain water only. Avoid taking with mineral water, milk, or other drinks. Swallow whole. Do not chew, break, or crush. HOW DO I STORE AND/OR THROW OUT THIS DRUG? Store at room temperature. Store in a dry place. Do not store in a bathroom. Keep all drugs in a safe place. Keep all drugs out of the reach of children and pets. Throw away unused or  drugs. Do not flush down a toilet or pour down a drain unless you are told to do so. Check with your pharmacist if you have questions about the best way to throw out drugs. There may be drug take-back programs in your area. WHAT DO I DO IF I MISS A DOSE? Take the missed dose on the next morning after you think about it and then go back to your normal time. Do not take 2 doses on the same day.   CAUTIONS:  Tell all of your health care providers that you take this drug. This includes your doctors, nurses, pharmacists, and dentists. Very bad swallowing tube (esophagus) problems like irritation, swelling, ulcers, and bleeding have happened with this drug. Talk with the doctor. Worsening of asthma has happened in people taking drugs like this one. Talk with the doctor. This drug may raise the chance of a broken leg. Talk with the doctor. Have a bone density test as you have been told by your doctor. Talk with your doctor. This drug works best when used with calcium/vitamin D and weight-bearing workouts like walking or PT (physical therapy). Follow the diet and workout plan that your doctor told you about. Have a dental exam before starting this drug. Take good care of your teeth. See a dentist often. Talk with your doctor before you drink alcohol. If you smoke, talk with your doctor. This drug is not approved for use in children. However, the doctor may decide the benefits of taking this drug outweigh the risks. If your child has been given this drug, ask the doctor for information about the benefits and risks. Talk with the doctor if you have questions about giving this drug to your child. If you are pregnant or you get pregnant while taking this drug, call your doctor right away. Tell your doctor if you are breast-feeding. You will need to talk about any risks to your baby.   POSSIBLE SIDE EFFECTS:  WHAT ARE SOME SIDE EFFECTS THAT I NEED TO CALL MY DOCTOR ABOUT RIGHT AWAY? WARNING/CAUTION: Even though it may be rare, some people may have very bad and sometimes deadly side effects when taking a drug. Tell your doctor or get medical help right away if you have any of the following signs or symptoms that may be related to a very bad side effect: Signs of an allergic reaction, like rash; hives; itching; red, swollen, blistered, or peeling skin with or without fever; wheezing; tightness in the chest or throat; trouble breathing, swallowing, or talking; unusual hoarseness; or swelling of the mouth, face, lips, tongue, or throat. Signs of low calcium levels like muscle cramps or spasms, numbness and tingling, or seizures. Black, tarry, or bloody stools. Chest pain. Coughing up blood. Heartburn. Trouble swallowing. Very bad pain when swallowing. Sore throat. Throwing up blood or throw up that looks like coffee grounds. Very bad bone, joint, or muscle pain. Any new or strange groin, hip, or thigh pain. Mouth sores. This drug may cause jawbone problems. The risk may be higher with longer use, cancer, dental problems, ill-fitting dentures, anemia, blood clotting problems, or infection. It may also be higher if you have dental work, chemo, radiation, or take other drugs that may cause jawbone problems. Many drugs can do this. Talk with your doctor if any of these apply to you, or if you have questions. Call your doctor right away if you have jaw swelling or pain. WHAT ARE SOME OTHER SIDE EFFECTS OF THIS DRUG? All drugs may cause side effects. However, many people have no side effects or only have minor side effects. Call your doctor or get medical help if any of these side effects or any other side effects bother you or do not go away: Stomach pain. Upset stomach or throwing up. Diarrhea or constipation. Headache. Muscle or joint pain. These are not all of the side effects that may occur. If you have questions about side effects, call your doctor. Call your doctor for medical advice about side effects. You may report side effects to the FDA at 1-887.119.6456. You may also report side effects at https://www.fda.gov/medwatch.   BEFORE USING THIS MEDICINE:  WHAT DO I NEED TO TELL MY DOCTOR BEFORE I TAKE THIS DRUG? TELL YOUR DOCTOR: If you have an allergy to alendronate or any other part of this drug. TELL YOUR DOCTOR: If you are allergic to this drug; any part of this drug; or any other drugs, foods, or substances. Tell your doctor about the allergy and what signs you had. TELL YOUR DOCTOR: If you have any of these health problems: A swallowing tube (esophagus) that is not normal, low calcium levels, kidney disease, or trouble swallowing. TELL YOUR DOCTOR: If you are not able to stand or sit up for 30 minutes. This is not a list of all drugs or health problems that interact with this drug. Tell your doctor and pharmacist about all of your drugs (prescription or OTC, natural products, vitamins) and health problems. You must check to make sure that it is safe for you to take this drug with all of your drugs and health problems. Do not start, stop, or change the dose of any drug without checking with your doctor.   OVERDOSE:  If you think there has been an overdose, call your poison control center or get medical care right away. Be ready to tell or show what was taken, how much, and when it happened.   ADDITIONAL INFORMATION:  If your symptoms or health problems do not get better or if they become worse, call your doctor. Do not share your drugs with others and do not take anyone else's drugs. This drug comes with an extra patient fact sheet called a Medication Guide. Read it with care. Read it again each time this drug is refilled. If you have any questions about this drug, please talk with the doctor, pharmacist, or other health care provider.   Copyright 2019 [employer]. All rights reserved.

## 2019-11-21 NOTE — ASSESSMENT
[FreeTextEntry1] : health  bmi 18 and discussed increased exercise.  She is up to date with pap, colonoscopy bone density but will need repeat next year.  She is up to date with dental but discussed removal of her wisdom teeth since her bite is shifting.  Mammogram is ordered and she needs dtap vaccine. I will give next visit due to her recent cough.  She has osteoporosis and discussed taking medication which she initially refused.  she understands risks of osteoporosis and fall precautions .  Osteoporosis is a common problem that causes your bones to become abnormally thin, weakened, and easily broken (fractured). Women are at a higher risk for osteoporosis after menopause due to lower levels of estrogen, a female hormone that helps to maintain bone mass.\par \par Fortunately, preventive treatments are available that can help to maintain or increase your bone density. If you have already been diagnosed with osteoporosis, therapies are available that can slow further loss of bone or increase bone density.\par \par This topic review discusses the therapies available for the prevention and treatment of osteoporosis. A separate topic discusses bone density testing. (See "Patient education: Bone density testing (Beyond the Basics)".)\par \par \par OSTEOPOROSIS PREVENTION\par Some of the most important aspects of preventing osteoporosis include eating a healthy diet, getting regular exercise, and avoiding smoking. These recommendations apply to men and women.\par \par Diet — An optimal diet for bone health involves making sure you get enough protein and calories as well as plenty of calcium and vitamin D, which are essential in helping to maintain proper bone formation and density.\par \par Calcium intake — Experts recommend that premenopausal women and men consume at least 1000 mg of calcium per day; this includes calcium in foods and beverages plus supplements (eg, pills), which you might need if you don't get enough calcium from your diet. Postmenopausal women should consume 1200 mg of calcium per day (total of diet plus supplements). However, you should not take more than 2000 mg calcium per day, due to the possibility of side effects. (See "Patient education: Calcium and vitamin D for bone health (Beyond the Basics)".)\par \par The main dietary sources of calcium include milk and other dairy products, such as cottage cheese, yogurt, and hard cheese, and green vegetables, such as kale and broccoli (table 1). A rough method of estimating your dietary calcium intake is to multiply the number of dairy servings you consume each day by 300 mg. Examples of a serving include 8 oz of milk (236 mL) or yogurt (224 g), 1 oz (28 g) of hard cheese, or 16 oz (448 g) of cottage cheese.\par \par If you don't get enough calcium through your diet, your health care provider might suggest supplements (calcium carbonate or calcium citrate) (table 2). Supplements are often recommended for women since they are at higher risk of developing osteoporosis and they often don't consume enough through foods and beverages. If you need to take more than 500 mg/day of calcium in supplement form, you should take it in separate, smaller doses (eg, once in the morning and again in the evening).\par \par Vitamin D intake — Experts recommend that men over 70 years and postmenopausal women (ie, women who no longer have monthly periods) consume 800 international units (20 micrograms) of vitamin D each day. This dose appears to reduce bone loss and fracture rate in older women and men who have adequate calcium intake (see 'Calcium intake' above). Although the optimal intake has not been clearly established in premenopausal women or in younger men with osteoporosis, 600 international units (15 micrograms) of vitamin D daily is generally suggested.\par \par Milk supplemented with vitamin D is a primary dietary source of vitamin D; it contains approximately 100 international units (2.5 micrograms) per 8 oz (236 mL). Another good source is salmon, with approximately 800 international units (20 micrograms) per 3 oz (98 g) serving. Other foods, such as orange juice, yogurt, and cereal, are also available with added vitamin D (table 3). Many people do not get enough vitamin D from their diet; your health care provider might suggest a supplement to help reduce your risk of osteoporosis.\par \par Alcohol — Drinking a lot of alcohol (more than two drinks a day) can increase your risk of fracture.\par \par Exercise — Exercise may decrease fracture risk by improving bone mass in premenopausal women and helping to maintain bone density in women who have been through menopause. Furthermore, exercise can strengthen your muscles, improve your balance, and make you less likely to have a fall that could lead to fracture or other injury. Most experts recommend exercising for at least 30 minutes three times per week. Many different types of exercise, including resistance training (eg, using free weights or resistance bands), jogging, jumping, and walking, are effective.\par \par The benefits of exercise are quickly lost if you stop exercising. Finding a regular exercise regimen that you enjoy doing improves your chances of keeping up the habit over the long term. (See "Patient education: Exercise (Beyond the Basics)".)\par \par Smoking — Avoiding or quitting smoking is strongly recommended for bone health because smoking cigarettes is known to speed bone loss. One study suggested that women who smoke one pack per day throughout adulthood have a 5 to 10 percent reduction in bone density by menopause, resulting in an increased risk of fracture. (See "Patient education: Quitting smoking (Beyond the Basics)".)\par \par Avoiding falls — Falling significantly increases the risk of osteoporotic fractures in older adults. Taking measures to prevent falls can decrease the risk of fractures. Such measures may include the following:\par \par ?Removing loose rugs and electrical cords or any other loose items in the home that could lead to tripping, slipping, and falling.\par \par \par ?Providing adequate lighting in all areas inside and around the home, including stairwells and entrance ways.\par \par \par ?Avoiding walking on slippery surfaces, such as ice or wet or polished floors.\par \par \par ?Avoiding walking in unfamiliar areas outside.\par \par \par ?Reviewing drug regimens to replace medications that may increase the risk of falls with those that are less likely to do so.\par \par \par ?Visiting an ophthalmologist or optometrist regularly to check your vision.\par \par \par Medications that increase risk — Certain medications can increase bone loss, especially if used at high doses or over a long time. In some cases, you can reduce your risk of osteoporosis by stopping the medication, reducing the dose, or switching to a different medication. Medications that may increase bone loss include the following:\par \par ?Glucocorticoid medications (eg, prednisone)\par \par ?Heparin, an "anticoagulant" medication used to prevent and treat abnormal blood clotting\par \par ?Certain antiepileptic drugs (eg, phenytoin, carbamazepine, primidone, and phenobarbital)\par \par ?Aromatase inhibitors for the treatment of breast cancer (eg, letrozole, anastrozole)\par \par \par \par OSTEOPOROSIS SCREENING\par Experts suggest screening for osteoporosis for women 65 years and older and for women under 65 who have gone through menopause and have risk factors (such as past fracture, certain medical conditions or medications, or cigarette or alcohol use). Screening involves physical examination, discussion of the person's history, and measurement of bone density through imaging tests. Bone density testing is discussed in more detail separately. (See "Patient education: Bone density testing (Beyond the Basics)".)\par \par \par OSTEOPOROSIS TREATMENT\par The measures discussed above can help to prevent osteoporosis or reduce your risk of fracture if you already have osteoporosis. Depending on your situation, your health care provider may also recommend medication or hormonal therapy.\par \par Who needs treatment with a medication? — People with the highest risk of fracture are the ones most likely to benefit from drug therapy. In the [place], the National Osteoporosis Foundation (NOF) recommends use of a medication to treat postmenopausal women (and men =50 years) with a history of hip or vertebral (spine) fracture or with osteoporosis on bone density testing (T-score =-2.5). T-scores are numbers that doctors use to measure bone density based on the way your bones look on imaging (table 4).\par \par In addition, the NOF recommends treatment with medication for people who have low bone density (T-score between -1.0 and -2.5) and an estimated 10-year risk of hip or major osteoporosis-related fracture =3 or =20 percent, respectively. You can estimate your risk of fracture using the Fracture Risk Assessment Tool (FRAX) calculator; click on Calculation Tool, and select your region and country to begin.\par \par However, some people who do not meet the above criteria may benefit from a medication to prevent fractures. Your health care provider can talk to you about the risks and benefits and help you make a decision about treatment.\par \par Treatment in premenopausal women — The relationship between bone density and fracture risk in premenopausal women (ie, those who have not yet gone through menopause) is not well defined. A premenopausal woman with low bone density may have little increased risk of fracture. Thus, bone density alone should not be used to diagnose osteoporosis in a premenopausal woman; further evaluation for other potential causes of bone loss is generally recommended.\par \par Bisphosphonates — Bisphosphonates are medications that slow the breakdown and removal of bone (ie, resorption). They are widely used for the prevention and treatment of osteoporosis in postmenopausal women.\par \par These drugs need to be taken first thing in the morning on an empty stomach with a full 8 oz glass of plain (not sparkling) water. You then need to wait for a half hour or an hour, depending on which one you take, before eating or taking any other medications:\par \par ?Alendronate (brand name: Fosamax) or risedronate (brand names: Actonel, Atelvia) – If you take either of these drugs, wait at least half an hour. (See 'Risedronate' below and 'Ibandronate' below.)\par \par \par ?Ibandronate (brand name: Boniva) – If you take this drug, wait at least one hour. (See 'Ibandronate' below.)\par \par \par These instructions help ensure that the drugs will be absorbed and also reduce the risk of side effects and potential complications.\par \par A "delayed-release" formulation of risedronate is also available. Unlike immediate-release risedronate and other oral bisphosphonates, delayed-release risedronate is taken immediately after breakfast and with at least 4 ounces of water.\par \par After taking any oral bisphosphonate, remain upright (sitting or standing) for at least 30 minutes to minimize the risk of acid reflux and other gastrointestinal side effects. (See 'Side effects of bisphosphonates' below.)\par \par If you are at high risk for breaking a bone, you can safely take osteoporosis medicines for many years. However, most people can stop taking alendronate, risedronate, or ibandronate after five years. This is because these drugs have residual benefit, even after you stop them. This approach also minimizes side effects from long-term use. Your doctor will continue to monitor your bone density to determine if you need to start medication again.\par \par Side effects of bisphosphonates — Most people who take bisphosphonates do not have any serious side effects related to the medication. However, it is important to closely follow the instructions for taking the medication; lying down or eating sooner than the recommended time after a dose increases the risk of stomach upset.\par \par There has been concern about use of bisphosphonates in people who require invasive dental work. A problem known as osteonecrosis of the jaw has developed in people who used bisphosphonates. The risk of this problem is very small in people who take bisphosphonates for osteoporosis prevention and treatment. However, there is a slightly higher risk of this problem when higher doses of bisphosphonates are given into a vein during cancer treatment.\par \par For people who take bisphosphonates to treat osteoporosis, experts do not think that it is necessary to stop the medication before invasive dental work (eg, tooth extraction or implant). However, people who take a bisphosphonate as part of a treatment for cancer should consult their doctor before having invasive dental work.\par \par Taking bisphosphonates for a long time (eg, seven years or longer) can rarely increase the risk of an unusual type of femur (thigh bone) fracture. Taking bisphosphonates for up to five years for osteoporosis (the usual duration of treatment) is usually not associated with these "atypical" fractures, and the benefits outweigh the risk of this rare side effect.\par \par Alendronate — Alendronate (brand names: Binosto, Fosamax) reduces the risk of vertebral and hip fractures, and it decreases the loss of height associated with vertebral fractures. It is available as a pill that is taken once per day or once per week.\par \par Risedronate — Risedronate (brand names: Actonel, Atelvia) reduces the risk of both vertebral and hip fractures. Risedronate is approved for both prevention and treatment of osteoporosis. It can be taken once per day, once per week, or once per month.\par \par Ibandronate — Although ibandronate reduces the risk of bone loss and vertebral fractures, there is no proof that it reduces the risk of hip fractures, so it is not recommended as often as alendronate and risedronate. Ibandronate (brand name: Boniva) can be used for prevention and treatment of osteoporosis. It is available as a pill that is taken once per day or once per month. It is also available as an injection that is given into a vein once every three months.\par \par Zoledronic acid — A once-yearly, intravenous dose of zoledronic acid (sample brand name: Reclast) is also available for the treatment of osteoporosis. This medication is given into a vein (by "IV") over 15 minutes and is usually well tolerated. Zoledronic acid can improve bone density and decrease the risk of vertebral and hip fractures.\par \par Side effects of zoledronic acid can include flu-like symptoms within 24 to 72 hours of the first dose. This may include a low-grade fever and muscle and joint pain. Treatment with a fever-reducing medication (acetaminophen) generally improves the symptoms. Subsequent doses typically cause milder symptoms.\par \par Intravenous zoledronic acid is an appealing alternative for people who cannot tolerate oral bisphosphonates or who prefer a once yearly to a monthly, weekly, or daily regimen. Zoledronic acid is usually given for three years and then discontinued. Your doctor will monitor your bone density to see if it needs to be restarted.\par \par "Estrogen-like" medications — Certain medications, known as selective estrogen receptor modulators (SERMs), produce some estrogen-like effects in the bone. These medications, which include raloxifene (brand name: Evista) and tamoxifen, provide protection against postmenopausal bone loss. In addition, SERMs decrease the risk of breast cancer in women who are at high risk. Raloxifene can be used for the prevention and treatment of osteoporosis in postmenopausal women, although it may be less effective in preventing bone loss than bisphosphonates or estrogen (see 'Hormone therapy' below). Tamoxifen is usually given to women with breast cancer to reduce the risk of recurrence, or to women who have never had breast cancer but are at high risk of developing it. (See "Patient education: Medications for the prevention of breast cancer (Beyond the Basics)".)\par \par SERMs are not recommended for premenopausal women.\par \par Hormone therapy — In the past, hormone therapy with estrogen or estrogen-progestin was considered the best way to prevent postmenopausal osteoporosis, and it was often used for treatment. Data from the Women's Health Initiative (WHI), a large study designed to find out if hormone therapy would reduce the risk of coronary heart disease and osteoporosis after menopause, found that combined estrogen-progestin treatment reduced hip and vertebral fracture risk by 34 percent. A similar reduction in fracture risk was seen in women who took estrogen alone. Estrogen had the additional advantage of helping to control menopausal symptoms. However, the WHI found that estrogen plus progestin does not reduce the risk of coronary artery disease, and it slightly increases the risk of breast cancer, stroke, and blood clots in postmenopausal women. More information about the WHI is available elsewhere. (See "Patient education: Menopausal hormone therapy (Beyond the Basics)".)\par \par Thus, estrogen is not recommended for the treatment or prevention of osteoporosis in postmenopausal women. However, some postmenopausal women continue to use estrogen, including women with persistent menopausal symptoms and those who cannot tolerate other types of osteoporosis treatment. Women who take estrogen do not need additional drugs to prevent bone loss.\par \par Estrogen may be an appropriate treatment for prevention of osteoporosis in young women whose ovaries do not make estrogen. This treatment may be given as a skin patch or orally, such as a birth control pill. (See "Patient education: Absent or irregular periods (Beyond the Basics)".)\par \par Denosumab — Denosumab (brand name: Prolia) is an antibody directed against a specific protein involved in the formation of cells that break down bone. Denosumab improves bone mineral density and reduces fracture in postmenopausal women with osteoporosis. It is given as an injection under the skin once every six months. Although denosumab is generally well tolerated, side effects can include skin infections (cellulitis) and eczema. A mild transient lowering of blood calcium levels has also been reported, but this is not usually a problem in patients with good kidney function, who are taking enough calcium and vitamin D.\par \par Denosumab is usually reserved for people who are intolerant of or unresponsive to oral and/or intravenous bisphosphonates.\par \par Stopping denosumab results in bone loss within a relatively short time. An increased risk for vertebral fracture has been reported after stopping denosumab. If you need to stop taking denosumab, your doctor will prescribe an alternative medication to prevent rapid bone loss.\par \par Parathyroid hormone/parathyroid hormone-related protein — Parathyroid hormone (PTH) and parathyroid hormone-related protein (PTHrP) are unique osteoporosis drugs in that they are the only medications that work by stimulating bone formation. The other medications described above work by reducing bone resorption (anti-resorptives). Clinical trials suggest that PTH/PTHrP therapy is effective in the treatment of osteoporosis in postmenopausal women and in men and that these drugs are more effective than anti-resorptives.\par \par PTH (drug name: teriparatide) or a PTHrP analog (drug name: abaloparatide) may be a treatment option for some people with severe osteoporosis. These treatments are only used for up to two years, then replaced with an anti-resorptive.\par \par Calcitonin — Calcitonin is a hormone produced by the thyroid gland that, together with PTH, helps to regulate calcium concentrations in the body. Calcitonin is not used anymore to treat osteoporosis, because other available options (eg, bisphosphonates) are more effective for the prevention of bone loss and reduction of fracture risk. In addition, there is concern about the long-term use of calcitonin for osteoporosis and an increase in cancer rates. However, due to its pain-relieving effects, calcitonin may be suggested as short-term therapy for people who have acute pain due to vertebral fractures. The treatment regimen is typically changed once the acute pain subsides or if the pain fails to improve over a prolonged period (eg, four weeks).\par \par Calcitonin may be administered via nasal spray or injection. The nasal spray is typically preferred due to ease of use and because the injections tend to cause more nausea and flushing. (See "Calcitonin in the prevention and treatment of osteoporosis".)\par \par \par MONITORING RESPONSE TO TREATMENT\par If you take medication to prevent or treat osteoporosis, your doctor will monitor you to see how well it is working. This typically includes measurement of bone mineral density with dual-energy x-ray absorptiometry (DXA) (see "Patient education: Bone density testing (Beyond the Basics)"). Some people also get blood or urine tests; these can give information about the rate of bone turnover (ie, how quickly old bone is resorbing and new bone is forming).\par  cough zicam gargle call if symptoms worsens.  plenty of liquids.  gallbladder polyp  us of abd no change of gallbladder polyps.  fu yearly.

## 2019-11-21 NOTE — COUNSELING
[Fall prevention counseling provided] : Fall prevention counseling provided [Adequate lighting] : Adequate lighting [No throw rugs] : No throw rugs [Use proper foot wear] : Use proper foot wear [Sleep ___ hours/day] : Sleep [unfilled] hours/day [Engage in a relaxing activity] : Engage in a relaxing activity [Plan in advance] : Plan in advance [____ min/wk Activity] : [unfilled] min/wk activity [None] : None [Good understanding] : Patient has a good understanding of lifestyle changes and steps needed to achieve self management goal

## 2019-11-21 NOTE — PHYSICAL EXAM
[Well Developed] : well developed [Well Nourished] : well nourished [Normal Voice Quality] : was normal [Normal Verbal Skills] : the patient had normal verbal communication skills [Normal Nonverbal Skills] : normal nonverbal communication skills were demonstrated [Conjunctiva] : the conjunctiva were normal in both eyes [PERRL] : pupils were equal in size, round, and reactive to light [EOM Intact] : extraocular movements were intact [Normal Appearance] : was normal in appearance [Neck Supple] : was supple [Rate ___] : at [unfilled] breaths per minute [Normal Rhythm/Effort] : normal respiratory rhythm and effort [Clear Bilaterally] : the lungs were clear to auscultation bilaterally [Normal to Percussion] : the lungs were normal to percussion [5th Left ICS - MCL] : palpated at the 5th LICS in the midclavicular line [Heart Rate ___] : [unfilled] bpm [Rhythm Regular] : regular [Normal Rate] : normal [Normal S1] : normal S1 [Normal S2] : normal S2 [No Murmur] : no murmurs heard [No Pitting Edema] : no pitting edema present [2+] : left 2+ [No Abnormalities] : the abdominal aorta was not enlarged and no bruit was heard [Examination Of The Breasts] : a normal appearance [No Discharge] : no discharge [Scar] : a scar was noted [Suprapubic] : in the suprapubic area [Mid-line] : in the mid-line [Soft, Nontender] : the abdomen was soft and nontender [No Mass] : no masses were palpated [No HSM] : no hepatosplenomegaly noted [None] : no hernias were palpable [No Lymphangitis] : no lymphangitis observed [Normal Kyphosis] : normal kyphosis [No Visual Abnormalities] : no visible abnormalities [Normal Lordosis] : normal lordosis [No Scoliosis] : no scoliosis [No Tenderness to Palpation] : no spine tenderness on palpation [No Masses] : no masses [Full ROM] : full ROM [No Pain with ROM] : no pain with motion in any direction [Intact] : all reflexes within normal limits bilaterally [Normal Station and Gait] : the gait and station were normal [Normal Motor Tone] : the muscle tone was normal [Involuntary Movements] : no involuntary movements were seen [Normal Scalp] : inspection of the scalp showed no abnormalities [Examination Of The Hair] : texture and distribution of hair was normal [Complexion Fair] : fair complexion [Normal] : the deep tendon reflexes were normal [Normal Mental Status] : the patient's orientation, memory, attention, language and fund of knowledge were normal [Appropriate] : appropriate [Enlarged Diffusely] : was not enlarged [JVP Elevated ___cm] : the JVP was not elevated [S3] : no S3 [S4] : no S4 [Rt] : no varicose veins of the right leg [Lt] : no varicose veins of the left leg [Right Carotid Bruit] : no bruit heard over the right carotid [Left Carotid Bruit] : no bruit heard over the left carotid [Right Femoral Bruit] : no bruit heard over the right femoral artery [Left Femoral Bruit] : no bruit heard over the left femoral artery [Bruit] : no bruit heard [Postauricular Lymph Nodes Enlarged Bilaterally] : nodes not enlarged [Preauricular Lymph Nodes Enlarged Bilaterally] : nodes not enlarged [Submandibular Lymph Nodes Enlarged Bilaterally] : nodes not enlarged [Suboccipital Lymph Nodes Enlarged Bilaterally] : nodes not enlarged [Submental Lymph Nodes Enlarged] : nodes not enlarged [Cervical Lymph Nodes Enlarged Posterior Bilaterally] : nodes not enlarged [Cervical Lymph Nodes Enlarged Anterior Bilaterally] : nodes not enlarged [Supraclavicular Lymph Nodes Enlarged Bilaterally] : nodes not enlarged [Axillary Lymph Nodes Enlarged Bilaterally] : nodes not enlarged [Epitrochlear Lymph Nodes Enlarged Bilaterally] : nodes not enlarged [Femoral Lymph Nodes Enlarged Bilaterally] : nodes not enlarged [Inguinal Lymph Nodes Enlarged Bilaterally] : nodes not enlarged [Abnormal Color] : normal color and pigmentation [Skin Lesions 1] : no skin lesions were observed [Skin Turgor Decreased] : normal skin turgor [Impaired judgment] : intact judgment [Impaired Insight] : intact insight [de-identified] : tongue normal  impacted wisdom teeth in lower bilateral  [de-identified] : done by gyn

## 2019-11-22 LAB
25(OH)D3 SERPL-MCNC: 43.6 NG/ML
ALBUMIN SERPL ELPH-MCNC: 4.7 G/DL
ALP BLD-CCNC: 56 U/L
ALT SERPL-CCNC: 32 U/L
ANION GAP SERPL CALC-SCNC: 16 MMOL/L
APPEARANCE: CLEAR
AST SERPL-CCNC: 35 U/L
BASOPHILS # BLD AUTO: 0.05 K/UL
BASOPHILS NFR BLD AUTO: 1.2 %
BILIRUB SERPL-MCNC: 0.3 MG/DL
BILIRUBIN URINE: NEGATIVE
BLOOD URINE: ABNORMAL
BUN SERPL-MCNC: 11 MG/DL
CALCIUM SERPL-MCNC: 9.6 MG/DL
CHLORIDE SERPL-SCNC: 101 MMOL/L
CHOLEST SERPL-MCNC: 208 MG/DL
CHOLEST/HDLC SERPL: 3.9 RATIO
CO2 SERPL-SCNC: 24 MMOL/L
COLOR: YELLOW
CREAT SERPL-MCNC: 0.51 MG/DL
EOSINOPHIL # BLD AUTO: 0.06 K/UL
EOSINOPHIL NFR BLD AUTO: 1.4 %
FERRITIN SERPL-MCNC: 374 NG/ML
GLUCOSE QUALITATIVE U: NEGATIVE
GLUCOSE SERPL-MCNC: 99 MG/DL
HCT VFR BLD CALC: 41.6 %
HDLC SERPL-MCNC: 53 MG/DL
HGB BLD-MCNC: 13.4 G/DL
HIV1+2 AB SPEC QL IA.RAPID: NONREACTIVE
IMM GRANULOCYTES NFR BLD AUTO: 0.7 %
KETONES URINE: ABNORMAL
LDLC SERPL CALC-MCNC: 133 MG/DL
LEUKOCYTE ESTERASE URINE: NEGATIVE
LYMPHOCYTES # BLD AUTO: 1.37 K/UL
LYMPHOCYTES NFR BLD AUTO: 31.7 %
MAN DIFF?: NORMAL
MCHC RBC-ENTMCNC: 30.4 PG
MCHC RBC-ENTMCNC: 32.2 GM/DL
MCV RBC AUTO: 94.3 FL
MONOCYTES # BLD AUTO: 0.55 K/UL
MONOCYTES NFR BLD AUTO: 12.7 %
NEUTROPHILS # BLD AUTO: 2.26 K/UL
NEUTROPHILS NFR BLD AUTO: 52.3 %
NITRITE URINE: NEGATIVE
PH URINE: 6
PLATELET # BLD AUTO: 218 K/UL
POTASSIUM SERPL-SCNC: 3.9 MMOL/L
PROT SERPL-MCNC: 7.2 G/DL
PROTEIN URINE: NORMAL
RBC # BLD: 4.41 M/UL
RBC # FLD: 13.5 %
SODIUM SERPL-SCNC: 141 MMOL/L
SPECIFIC GRAVITY URINE: 1.02
TRIGL SERPL-MCNC: 109 MG/DL
UROBILINOGEN URINE: NORMAL
WBC # FLD AUTO: 4.32 K/UL

## 2019-12-03 ENCOUNTER — TRANSCRIPTION ENCOUNTER (OUTPATIENT)
Age: 59
End: 2019-12-03

## 2020-01-07 ENCOUNTER — FORM ENCOUNTER (OUTPATIENT)
Age: 60
End: 2020-01-07

## 2020-01-08 ENCOUNTER — APPOINTMENT (OUTPATIENT)
Dept: MAMMOGRAPHY | Facility: HOSPITAL | Age: 60
End: 2020-01-08
Payer: COMMERCIAL

## 2020-01-08 ENCOUNTER — OUTPATIENT (OUTPATIENT)
Dept: OUTPATIENT SERVICES | Facility: HOSPITAL | Age: 60
LOS: 1 days | End: 2020-01-08
Payer: COMMERCIAL

## 2020-01-08 DIAGNOSIS — Z12.31 ENCOUNTER FOR SCREENING MAMMOGRAM FOR MALIGNANT NEOPLASM OF BREAST: ICD-10-CM

## 2020-01-08 PROCEDURE — 77067 SCR MAMMO BI INCL CAD: CPT

## 2020-01-08 PROCEDURE — 77063 BREAST TOMOSYNTHESIS BI: CPT

## 2020-01-08 PROCEDURE — 77063 BREAST TOMOSYNTHESIS BI: CPT | Mod: 26

## 2020-01-08 PROCEDURE — 77067 SCR MAMMO BI INCL CAD: CPT | Mod: 26

## 2020-04-09 ENCOUNTER — RX RENEWAL (OUTPATIENT)
Age: 60
End: 2020-04-09

## 2020-04-26 ENCOUNTER — MESSAGE (OUTPATIENT)
Age: 60
End: 2020-04-26

## 2020-04-30 ENCOUNTER — APPOINTMENT (OUTPATIENT)
Dept: DISASTER EMERGENCY | Facility: CLINIC | Age: 60
End: 2020-04-30

## 2020-05-12 ENCOUNTER — APPOINTMENT (OUTPATIENT)
Dept: INTERNAL MEDICINE | Facility: CLINIC | Age: 60
End: 2020-05-12
Payer: COMMERCIAL

## 2020-05-12 DIAGNOSIS — N39.0 URINARY TRACT INFECTION, SITE NOT SPECIFIED: ICD-10-CM

## 2020-05-12 PROCEDURE — 99213 OFFICE O/P EST LOW 20 MIN: CPT | Mod: 95

## 2020-05-12 NOTE — HISTORY OF PRESENT ILLNESS
[FreeTextEntry1] : Pt was called  and has given phone consent to have a video visit As a response to the [place] virus outbreak we are doing our best to keep pts healthy  . In order to do so we would like to  create your fu appt as a telehealth encounter. Pt is at home and I am in my office at [address] [place] [address] .  The visit is face to face via telehealth.\par  [de-identified] : P tis feeling well . She wanted to have repeat gb polyp and to increase  her fosamax.  She is not having abd pain or dysphagia.  No nausea or vomiting.  She is  positive covid ab.  Her last bone density  and needs repeat  .

## 2020-05-12 NOTE — PHYSICAL EXAM
[Normal Sclera/Conjunctiva] : normal sclera/conjunctiva [Normal Oropharynx] : the oropharynx was normal [No Edema] : there was no peripheral edema [No Extremity Clubbing/Cyanosis] : no extremity clubbing/cyanosis [No Joint Swelling] : no joint swelling [Normal] : affect was normal and insight and judgment were intact

## 2020-05-12 NOTE — ASSESSMENT
[FreeTextEntry1] : osteoporosis -  Make your home safer – To avoid falling at home, get rid of things that might make you trip or slip. This might include furniture, electrical cords, clutter, and loose rugs Keep your home well-lit so that you can easily see where you are going. Avoid storing things in high places so you don't have to reach or climb.\par ?Wear sturdy shoes that fit well – Wearing shoes with high heels or slippery soles, or shoes that are too loose, can lead to falls. Walking around in bare feet, or only socks, can also increase your risk of falling.\par ?Take vitamin D pills – Taking vitamin D might lower the risk of falls in older people. This is because vitamin D helps make bones and muscles stronger. Your doctor can talk to you about whether you should take extra vitamin D, and how much.\par ?Stay active – Exercising on a regular basis can help lower your risk of falling. It might also help prevent you from getting hurt if you do fall. It is best to do a few different activities that help with both strength and balance. There are many kinds of exercise that can be safe for older people. These include walking, swimming, and [place] Chi (a Chinese martial art that involves slow, gentle movements).\par ?Use a cane, walker, and other safety devices – If your doctor recommends that you use a cane or walker, be sure that it's the right size and you know how to use it. There are other devices that might help you avoid falling, too. These include grab bars or a sturdy seat for the shower, non-slip bath mats, and hand rails or treads for the stairs (to prevent slipping).\par If you worry that you could fall, there are also alarm buttons that let you call for help if you fall and can't get up.\par \par She will have fosamax increased \par  gallbladder polyps Surgery is necessary if polyps cause symptoms or are larger than 1 cm. Doctors also recommend surgery when a polyp has grown by 2 mm or more since the last checkup. True gallbladder polyps are rare, and they can cause gallbladder cancer. The treatment involves surgical removal of the gallbladder Very small polyps, those less than 1 centimeter (or less than 1.5 cm, according to some studies) may not need gallbladder removal surgery, and instead can be regularly monitored by scanning and re-evaluated for any suspicious changes that could indicate gallbladder cancer.\par  \par Polyps larger than 1 centimeter in size are more likely to become cancerous, especially those that are 1.5 centimeter across and larger — they have a 46 to 70 percent chance of containing cancer cells.\par  \par Monitoring for gallbladder polyps less than 1.5 centimeter should occur every three to six months for up to two years, after which it can be stopped if there have been no changes in the polyps. It isn't recommended that gallbladder polyps smaller than 0.5 centimeter across be treated by having the gallbladder removed. In gallbladder polyps that are that small, the risk of gallbladder cancer is extremely rare.\par  \par Gallbladder polyps that appear cancerous can be treated by surgical removal of the gallbladder. For larger gallbladder polyps, cholecystectomy may also be recommended to prevent the development of gallbladder cancer.\par   \par Deciding how to treat gallbladder polyps requires using thoughtful balance — weighing the potential risks of surgery against the potential risks of the development of gallbladder cancer. Paying attention to the overall cancer risk and considering careful monitoring of gallbladder polyps can be an effective treatment strategy to preserve your health\par hematuria  Hematuria may be a symptom of an underlying disease, some of which are life threatening and some of which are treatable . The causes vary with age, with the most common being inflammation or infection of the prostate or bladder, stones, and, in older patients, a kidney or urinary tract malignancy or benign prostatic hyperplasia (BPH) . )\par

## 2020-05-19 ENCOUNTER — APPOINTMENT (OUTPATIENT)
Dept: ULTRASOUND IMAGING | Facility: CLINIC | Age: 60
End: 2020-05-19
Payer: COMMERCIAL

## 2020-05-19 ENCOUNTER — OUTPATIENT (OUTPATIENT)
Dept: OUTPATIENT SERVICES | Facility: HOSPITAL | Age: 60
LOS: 1 days | End: 2020-05-19

## 2020-05-19 ENCOUNTER — APPOINTMENT (OUTPATIENT)
Dept: RADIOLOGY | Facility: CLINIC | Age: 60
End: 2020-05-19
Payer: COMMERCIAL

## 2020-05-19 PROCEDURE — 77085 DXA BONE DENSITY AXL VRT FX: CPT | Mod: 26

## 2020-05-19 PROCEDURE — 76700 US EXAM ABDOM COMPLETE: CPT | Mod: 26

## 2020-06-02 LAB
BASOPHILS # BLD AUTO: 0.05 K/UL
BASOPHILS NFR BLD AUTO: 1.3 %
EOSINOPHIL # BLD AUTO: 0.07 K/UL
EOSINOPHIL NFR BLD AUTO: 1.8 %
HCT VFR BLD CALC: 41.8 %
HGB BLD-MCNC: 13.2 G/DL
IMM GRANULOCYTES NFR BLD AUTO: 0.3 %
LYMPHOCYTES # BLD AUTO: 1.65 K/UL
LYMPHOCYTES NFR BLD AUTO: 43 %
MAN DIFF?: NORMAL
MCHC RBC-ENTMCNC: 29.5 PG
MCHC RBC-ENTMCNC: 31.6 GM/DL
MCV RBC AUTO: 93.5 FL
MONOCYTES # BLD AUTO: 0.34 K/UL
MONOCYTES NFR BLD AUTO: 8.9 %
NEUTROPHILS # BLD AUTO: 1.72 K/UL
NEUTROPHILS NFR BLD AUTO: 44.7 %
PLATELET # BLD AUTO: 305 K/UL
RBC # BLD: 4.47 M/UL
RBC # FLD: 13.6 %
WBC # FLD AUTO: 3.84 K/UL

## 2020-06-03 LAB
25(OH)D3 SERPL-MCNC: 37 NG/ML
ALBUMIN SERPL ELPH-MCNC: 4.9 G/DL
ALP BLD-CCNC: 40 U/L
ALT SERPL-CCNC: 18 U/L
ANION GAP SERPL CALC-SCNC: 14 MMOL/L
APPEARANCE: CLEAR
AST SERPL-CCNC: 23 U/L
BILIRUB SERPL-MCNC: 0.6 MG/DL
BILIRUBIN URINE: NEGATIVE
BLOOD URINE: NEGATIVE
BUN SERPL-MCNC: 13 MG/DL
CALCIUM SERPL-MCNC: 9.5 MG/DL
CANCER AG19-9 SERPL-ACNC: 38 U/ML
CHLORIDE SERPL-SCNC: 103 MMOL/L
CHOLEST SERPL-MCNC: 243 MG/DL
CHOLEST/HDLC SERPL: 4.5 RATIO
CO2 SERPL-SCNC: 25 MMOL/L
COLOR: YELLOW
CREAT SERPL-MCNC: 0.48 MG/DL
FERRITIN SERPL-MCNC: 341 NG/ML
GLUCOSE QUALITATIVE U: NEGATIVE
GLUCOSE SERPL-MCNC: 95 MG/DL
HDLC SERPL-MCNC: 54 MG/DL
IRON SATN MFR SERPL: 44 %
IRON SERPL-MCNC: 136 UG/DL
KETONES URINE: NEGATIVE
LDLC SERPL CALC-MCNC: 166 MG/DL
LEUKOCYTE ESTERASE URINE: NEGATIVE
NITRITE URINE: NEGATIVE
PH URINE: 7
POTASSIUM SERPL-SCNC: 4.4 MMOL/L
PROT SERPL-MCNC: 7.1 G/DL
PROTEIN URINE: NORMAL
SODIUM SERPL-SCNC: 142 MMOL/L
SPECIFIC GRAVITY URINE: 1.02
TIBC SERPL-MCNC: 307 UG/DL
TRIGL SERPL-MCNC: 119 MG/DL
UIBC SERPL-MCNC: 170 UG/DL
UROBILINOGEN URINE: NORMAL

## 2020-06-04 ENCOUNTER — APPOINTMENT (OUTPATIENT)
Dept: INTERNAL MEDICINE | Facility: CLINIC | Age: 60
End: 2020-06-04

## 2020-06-10 ENCOUNTER — APPOINTMENT (OUTPATIENT)
Dept: INTERNAL MEDICINE | Facility: CLINIC | Age: 60
End: 2020-06-10
Payer: COMMERCIAL

## 2020-06-10 VITALS
WEIGHT: 93 LBS | DIASTOLIC BLOOD PRESSURE: 92 MMHG | TEMPERATURE: 98.1 F | OXYGEN SATURATION: 98 % | HEART RATE: 67 BPM | BODY MASS INDEX: 19.44 KG/M2 | SYSTOLIC BLOOD PRESSURE: 153 MMHG

## 2020-06-10 DIAGNOSIS — Z01.419 ENCOUNTER FOR GYNECOLOGICAL EXAMINATION (GENERAL) (ROUTINE) W/OUT ABNORMAL FINDINGS: ICD-10-CM

## 2020-06-10 LAB
SARS-COV-2 IGG SERPL IA-ACNC: 6.7 RATIO
SARS-COV-2 IGG SERPL QL IA: POSITIVE

## 2020-06-10 PROCEDURE — 99214 OFFICE O/P EST MOD 30 MIN: CPT

## 2020-06-10 NOTE — ASSESSMENT
[FreeTextEntry1] : elevated ay00-0VF 19.9 (sialyl marva-a) is a monosialoganglioside with small increases in a number of benign diseases, with highest levels in pancreatic adenocarcinoma, hepatocellular, and cholangiocellular cancer, and also in gastric, colorectal, and occasionally other cancers.1\par \par Physiologically elevated concentrations are present in many secretions of healthy individuals with the Marva a positive phenotype in contrast with low serum levels of CA19.9 in Marva a negative individuals (7–10%).1\par \par Among non-malignant causes, obstructive jaundice is frequently associated with increases in CA19.9. Relief of jaundice is often associated with a fall in CA19.9 in benign cases and mostly in patients with malignancy.2 Normal biliary epithelial cells secrete mucins carrying the epitope of CA 19.9. Unspecific elevation of CA 19.9 in serum reflects both inflammatory hypersecretion and leakage of biliary mucins into serum.3\par \par In addition, there is a strong correlation between serum CA19.9 concentration and standard parameters of cholestasis; alkaline phosphatase and bilirubin during acute liver failure, acute hepatitis, and chronic liver diseases of any aetiology.4–6\par \par Other benign causes of CA19.9 increases are shown in table 1?. The common underlying mechanism for each is probably inflammatory hypersecretion of CA19.9 by normal epithelial cells.\par \par \par Table 1 \par \par \par Non-malignant causes of CA19.9 elevation (medline research)\par \par \par \par \par Serum CA19.9 level \par \par \par Aetiology\par \par Mild elevation (<200 U/ml)\par \par High levels (>1000 U/ml)\par \par \par Obstructive jaundice2,3  + \par Acute liver failure and acute hepatitis4,5  + \par Chronic liver disease4 +  \par     Alcoholic liver disease4–6 + + \par     Non-alcoholic liver disease4,5 +  \par Pancreatitis   \par     Acute8,9 + + \par     Chronic8,9 +  \par Diabetes [id number] +  \par Interstitial pulmonary [id number] +  \par Collagen vascular [id number] +  \par \par \par \par \par Anecdotally reports: hydronephrosis,13 endometriosis,14 splenic cyst,15 bronchogenic cyst,16 sigmoid diverticulitis,17 and hypothyroidism.18\par \par The constituent flavonoids of tea beverage are known to be potent antioxidants. It appears that the flavonoids impact on a wide range of molecular targets that influence cell growth and pathways of angiogenesis.7\par \par In this case, the usual causes of CA19.9 elevation were ruled out before a link with the tea beverage was suspected. After tea consumption withdrawal, a dramatic improvement occurred and the patient became symptom free, suggesting the diagnosis of tea intoxication. At the same time, serum CA19.9 levels dropped and a positive rechallenge test proved the relationship between tea overconsumption and raised levels of CA19.9.\par \par To our knowledge, this is the first reported case of markedly raised levels of CA19.9 associated with heavy tea consumption. The mechanism of this relation remains unclear. In addition, the epithelial tissue target involved in CA19.9 secretion by tea overuse is unknown. Individual susceptibility to abnormal CA19.9 secretion triggered by tea overconsumption could not be excluded.\par  She does drink tea green tea but no more than 3 cups a day.  I discussed b9 causes and malignant . her level is not consistent with malignancy.    she will have repeat and also do mrcp if elevated  for full evaluation it could be due to her recent infection  with covid .  she also could be  a marva positive phenotype  .   COVID-19 stands for "coronavirus disease 2019." It is caused by a virus called SARS-CoV-2. The virus first appeared in late 2019 and quickly spread around the world.\par \par People with COVID-19 can have fever, cough, and trouble breathing. Problems with breathing happen when the infection affects the lungs and causes pneumonia (figure 1).\par \par Most people who get COVID-19 will not get severely ill. But some do. In many areas, leaders are telling people to stay home and away from other people. This is to try to slow the spread of the virus.\par \par \par How is COVID-19 spread?\par The virus that causes COVID-19 mainly spreads from person to person. This usually happens when a sick person coughs, sneezes, or talks near other people. Doctors also think it is possible to get sick if you touch a surface that has the virus on it and then touch your mouth, nose, or eyes. This is similar to how the flu spreads, but the virus that causes COVID-19 spreads more easily.\par \par From what experts know so far, the virus seems to spread most easily when people are showing symptoms. But it is also possible to spread it without having symptoms.\par \par \par What are the symptoms of COVID-19?\par Symptoms usually start 4 or 5 days after a person is infected with the virus. But in some people, it can take up to 2 weeks for symptoms to appear.\par \par Symptoms can include:\par \par ?Fever \par \par ?Dry cough\par \par ?Feeling tired\par \par ?Muscle aches\par \par ?Trouble breathing \par \par \par Although less common, some people have other symptoms, such as headache, sore throat, runny nose, or problems with their sense of smell or taste. Some have digestive problems like nausea or diarrhea. \par \par For most people, symptoms will get better within a few weeks. Some people even show no symptoms at all. But in others, COVID-19 can lead to serious problems like pneumonia, not getting enough oxygen, heart problems, or even death. This is more common in people who are 65 years or older or have other health problems like heart disease, diabetes, lung disease, or cancer.\par \par While children can get COVID-19, they are less likely to have severe symptoms. More information about COVID-19 and children is available separately. (See "Patient education: Coronavirus disease 2019 (COVID-19) and children (The Basics)".)\par \par \par What should I do if I have symptoms?\par If you have a fever, cough, or trouble breathing, call your doctor or nurse. They will ask about your symptoms. They might also ask about any recent travel and whether you have been around anyone who might be sick.\par \par If your symptoms are not severe, it is best to call before you go in. They can tell you what to do and whether you need to be seen in person. Many people with only mild symptoms should stay home and avoid other people until they get better. If you do need to go to the clinic or hospital, cover your nose and mouth with cloth. This helps protect other people. The staff might also have you wait someplace away from other people.\par \par If you are severely ill and need to go to the clinic or hospital right away, you should still call ahead. This way the staff can care for you while taking steps to protect others.\par \par \par Will I need tests?\par If your doctor or nurse suspects you have COVID-19, they might take a swab from inside your nose, and possibly your mouth, and send it to a lab for testing. If you are coughing up mucus, they might also test a sample of the mucus. These tests can help your doctor figure out if you have COVID-19 or another illness. \par \par In some areas, it might not be possible to test everyone who might have been exposed to the virus. If your doctor cannot test you, they might tell you to stay home, avoid other people, and call if your symptoms get worse.\par \par Your doctor might also order a chest X-ray or computed tomography (CT) scan to check your lungs.\par \par \par How is COVID-19 treated?\par There is no known specific treatment for COVID-19. Many people will be able to stay home while they get better, but people with serious symptoms or other health problems might need to go to the hospital:\par \par ?Mild illness – Mild illness means you might have symptoms like fever and cough, but you do not have trouble breathing. Most people with COVID-19 have mild illness and can rest at home until they get better. This usually takes about 2 weeks, but it's not the same for everyone.\par \par \par If you are recovering from COVID-19, it's important to stay home and "self-isolate" until your doctor or nurse tells you it's safe to go back to your normal activities. Self-isolation means staying apart from other people, even the people you live with. When you can stop self-isolation will depend on how long it has been since you had symptoms, and in some cases, whether you have had a negative test (showing that the virus is no longer in your body). \par \par \par ?Severe illness – If you have more severe illness with trouble breathing, you might need to stay in the hospital, possibly in the intensive care unit (also called the "ICU"). While you are there, you will most likely be in a special isolation room. Only medical staff will be allowed in the room, and they will have to wear special gowns, gloves, masks, and eye protection.\par \par \par The doctors and nurses can monitor and support your breathing and other body functions and make you as comfortable as possible. You might need extra oxygen to help you breathe easily. If you are having a very hard time breathing, you might need to be put on a ventilator. This is a machine to help you breathe.\par \par \par Doctors are studying several different treatments to learn whether they might work to treat COVID-19. In certain cases, doctors might recommend these treatments or being part of a clinical trial. A clinical trial is a scientific study that tests new medicines to see how well they work.\par \par \par Can COVID-19 be prevented?\par There is not yet a vaccine to prevent COVID-19. But there are things you can do to reduce your chances of getting it. These steps are a good idea for everyone, especially if you are in an area where the infection is spreading very quickly. But they are extra important for people age 65 years or older or who have other health problems. To help slow the spread of infection:\par \par ?Practice "social distancing." This means keeping people, even those who are healthy, away from each other. It is also sometimes called "physical distancing." The goal is to slow the spread of the virus that causes COVID-19.\par \par \par Avoiding large groups and events is an important part of social distancing. But even small gatherings can be risky, so it's best to stay home as much as you possibly can. When you do need to leave your home (for example, to get food or medicine), try your best to stay at least 6 feet (about 2 meters) away from other people. Some expert groups also recommend covering your face when you need to go out. (See 'What about face masks?' below.)\par \par \par ?Wash your hands with soap and water often. This is especially important after being out in public. Make sure to rub your hands with soap for at least 20 seconds, cleaning your wrists, fingernails, and in between your fingers. Then rinse your hands and dry them with a paper towel you can throw away.\par \par \par If you are not near a sink, you can use a hand sanitizing gel to clean your hands. The gels with at least 60 percent alcohol work the best. But it is better to wash with soap and water if you can.\par \par \par ?Avoid touching your face with your hands, especially your mouth, nose, or eyes.\par \par \par ?Avoid traveling if you can. Some experts recommend not traveling to or from certain areas where COVID-19 is spreading quickly. But any form of travel, especially if you spend time in crowded places like airports, increases your risk. If lots of people travel, it also makes it more likely that the virus will spread to more parts of the world.\par \par \par \par What about face masks?\par When COVID-19 started to spread throughout the world, expert groups in the [place] did not recommend that most people wear a face mask for protection. That's because if healthy people buy a lot of medical masks, there won't be enough for the doctors and nurses who need them. \par \par The virus is characterized by obstructing respiratory pathways with thick mucus that solidifies and blocks the airways and lungs. So they have discovered that in order to apply a medicine you have to open and unblock theses airways so that the treatment can be used to take effect however all of this takes a number of days.  Recommendations. to safe guard yourself  1. Drink lots of hot liquids - soups, coffee, teas ,warm water . In addition take a sip of warm water every 20 mins bc this keeps your mouth moist and washes any of the virus that’s entered your moth into your stomach where your gastric juices will neutralizer it before it can get to the lungs . 2. gargle with an antiseptic and warm water like vinegar or salt or lemon every day if possible 3. The virus attaches itself to hair and clothes and detergent or soap kills it but you must take bath or shower when you get in from the street. .Avoid sitting down in your home and go straight to the shower i. If you cant was your clothes daily, hang them in sunlight which also helps to neutralize the virus.  4. wash metallic surfaces very carefully bc the virus can stay viable on these for up to 9 day.s. Take note and be vigilant about touching hand rails, door knobs , etc and keep these clean in home.5. don’t smoke  6. Wash hands every 20 mins with any soap that foams and do this for 20 seconds 7. eat fruits and vegetables . Try to elevate your zinc levels . 8. animals don’t spread the virus to people , its a person to person transmission.  9. Try to avoid getting the common flu as this already weakens your system and try to avoid eating and drinking any cold things.  10. If you feel any discomfort in your throat or sore throat coming on, follow the above . the virus enters the system though the throat abut  will sit in the throat for 3-4 days before it passes into your lungs.    11. Breath in deeply and hold your breath for 10 seconds . if this can be done without coughing without difficulty , this shows that there is  no inflammation in your lungs , and absence of infection.  It is recommended to do this control every morning to help detect infection. You  are to monitor  temperature daily and monitor for sob, cough and any changes  should call me.  Pts can recover while remaining home. You should avoid using public transportation, ride sharing or taxis.  Separate yourself form other people and animals.  Call ahead before visiting a doctor  .  Wear a face mask.  Cover coughs and sneezes and clean your hands often .\par We are not sure if her antibodies will protect her or  how long they will last.  \par   osteoporosis She has no change on her  bone density  she is now on 70 mg will repeat in 1 yr and if no change will consider prolia.  She is to continue taking  fall precautions  and exercise  Make your home safer – To avoid falling at home, get rid of things that might make you trip or slip. This might include furniture, electrical cords, clutter, and loose rugs Keep your home well-lit so that you can easily see where you are going. Avoid storing things in high places so you don't have to reach or climb.\par ?Wear sturdy shoes that fit well – Wearing shoes with high heels or slippery soles, or shoes that are too loose, can lead to falls. Walking around in bare feet, or only socks, can also increase your risk of falling.\par ?Take vitamin D pills – Taking vitamin D might lower the risk of falls in older people. This is because vitamin D helps make bones and muscles stronger. Your doctor can talk to you about whether you should take extra vitamin D, and how much.\par ?Stay active – Exercising on a regular basis can help lower your risk of falling. It might also help prevent you from getting hurt if you do fall. It is best to do a few different activities that help with both strength and balance. There are many kinds of exercise that can be safe for older people. These include walking, swimming, and Kingsley Chi (a Chinese martial art that involves slow, gentle movements).\par ?Use a cane, walker, and other safety devices – If your doctor recommends that you use a cane or walker, be sure that it's the right size and you know how to use it. There are other devices that might help you avoid falling, too. These include grab bars or a sturdy seat for the shower, non-slip bath mats, and hand rails or treads for the stairs (to prevent slipping).\par If you worry that you could fall, there are also alarm buttons that let you call for help if you fall and can't get up.\par \par

## 2020-06-10 NOTE — PHYSICAL EXAM
[General Appearance - Alert] : alert [General Appearance - In No Acute Distress] : in no acute distress [Sclera] : the sclera and conjunctiva were normal [PERRL With Normal Accommodation] : pupils were equal in size, round, and reactive to light [Oropharynx] : the oropharynx was normal [FreeTextEntry1] : palate normal coloration [Auscultation Breath Sounds / Voice Sounds] : lungs were clear to auscultation bilaterally [Apical Impulse] : the apical impulse was normal [Heart Rate And Rhythm] : heart rate was normal and rhythm regular [Heart Sounds] : normal S1 and S2 [Heart Sounds Gallop] : no gallops [Full Pulse] : the pedal pulses are present [Edema] : there was no peripheral edema [Soft, Nontender] : the abdomen was soft and nontender [Normal] : normal to percussion [Rebound] : no rebound [Guarding] : no guarding [No Mass] : no masses were palpated [No HSM] : no hepatosplenomegaly noted [None] : no CVA tenderness [Cervical Lymph Nodes Enlarged Posterior Bilaterally] : posterior cervical [Cervical Lymph Nodes Enlarged Anterior Bilaterally] : anterior cervical [Supraclavicular Lymph Nodes Enlarged Bilaterally] : supraclavicular [Axillary Lymph Nodes Enlarged Bilaterally] : axillary [Femoral Lymph Nodes Enlarged Bilaterally] : femoral [Inguinal Lymph Nodes Enlarged Bilaterally] : inguinal [No CVA Tenderness] : no ~M costovertebral angle tenderness [No Spinal Tenderness] : no spinal tenderness [Abnormal Walk] : normal gait [Musculoskeletal - Swelling] : no joint swelling seen [Nail Clubbing] : no clubbing  or cyanosis of the fingernails [Skin Color & Pigmentation] : normal skin color and pigmentation [Motor Tone] : muscle strength and tone were normal [Skin Turgor] : normal skin turgor [] : no rash [No Focal Deficits] : no focal deficits [Oriented To Time, Place, And Person] : oriented to person, place, and time [Impaired Insight] : insight and judgment were intact [Affect] : the affect was normal

## 2020-06-10 NOTE — HISTORY OF PRESENT ILLNESS
[Heartburn] : denies heartburn [Nausea] : denies nausea [Vomiting] : denies vomiting [Diarrhea] : denies diarrhea [Constipation] : denies constipation [Yellow Skin Or Eyes (Jaundice)] : denies jaundice [Abdominal Swelling] : denies abdominal swelling [Abdominal Pain] : denies abdominal pain [Rectal Pain] : denies rectal pain [Good Compliance] : good compliance with treatment [Wt Gain ___ Lbs] : no recent weight gain [Wt Loss ___ Lbs] : no recent weight loss [GERD] : no gastroesophageal reflux disease [de-identified] : pt had elevated ca 19-9 and has hx of gallbladder polyps .  She doesn’t have weight loss , or abdominal pain.  She did have asymptomatic covid and I am unsure if she had an inflammatory reaction but will repeat her ca 19-9.  It is mildy elevated but her gb polyp up to 7mm in Oct.  They were stable.  She doesn’t have dark urine or light colored stools.

## 2020-06-11 LAB
ALBUMIN SERPL ELPH-MCNC: 4.7 G/DL
ALP BLD-CCNC: 37 U/L
ALT SERPL-CCNC: 15 U/L
ANION GAP SERPL CALC-SCNC: 13 MMOL/L
AST SERPL-CCNC: 20 U/L
BILIRUB DIRECT SERPL-MCNC: 0.1 MG/DL
BILIRUB INDIRECT SERPL-MCNC: 0.4 MG/DL
BILIRUB SERPL-MCNC: 0.5 MG/DL
BUN SERPL-MCNC: 13 MG/DL
CALCIUM SERPL-MCNC: 9.2 MG/DL
CANCER AG19-9 SERPL-ACNC: 34 U/ML
CEA SERPL-MCNC: 2.2 NG/ML
CHLORIDE SERPL-SCNC: 102 MMOL/L
CO2 SERPL-SCNC: 27 MMOL/L
CREAT SERPL-MCNC: 0.53 MG/DL
GLUCOSE SERPL-MCNC: 98 MG/DL
POTASSIUM SERPL-SCNC: 4.2 MMOL/L
PROT SERPL-MCNC: 6.6 G/DL
SODIUM SERPL-SCNC: 142 MMOL/L

## 2021-03-23 ENCOUNTER — APPOINTMENT (OUTPATIENT)
Dept: INTERNAL MEDICINE | Facility: CLINIC | Age: 61
End: 2021-03-23
Payer: COMMERCIAL

## 2021-03-23 VITALS
HEIGHT: 58 IN | WEIGHT: 97.9 LBS | DIASTOLIC BLOOD PRESSURE: 75 MMHG | SYSTOLIC BLOOD PRESSURE: 139 MMHG | HEART RATE: 64 BPM | TEMPERATURE: 97.8 F | OXYGEN SATURATION: 97 % | BODY MASS INDEX: 20.55 KG/M2

## 2021-03-23 DIAGNOSIS — R97.8 OTHER ABNORMAL TUMOR MARKERS: ICD-10-CM

## 2021-03-23 DIAGNOSIS — U07.1 COVID-19: ICD-10-CM

## 2021-03-23 PROCEDURE — 99072 ADDL SUPL MATRL&STAF TM PHE: CPT

## 2021-03-23 PROCEDURE — 99213 OFFICE O/P EST LOW 20 MIN: CPT

## 2021-03-23 RX ORDER — CIPROFLOXACIN HYDROCHLORIDE 250 MG/1
250 TABLET, FILM COATED ORAL
Qty: 14 | Refills: 0 | Status: COMPLETED | COMMUNITY
Start: 2019-11-25 | End: 2021-03-23

## 2021-03-23 NOTE — HISTORY OF PRESENT ILLNESS
[FreeTextEntry1] : fu osteoporosis  mammogram  [de-identified] : Pt needs cpe and is in need of bone density and mammogram  and us of abd for her gb polyps.  She states she is taking fosamax and has noticed her hair to be falling out.  she is not losing weight and is feeling well.  She has normal bm no abd pain or complaints.

## 2021-03-23 NOTE — HEALTH RISK ASSESSMENT
[] : No [No] : No [No falls in past year] : Patient reported no falls in the past year [0] : 2) Feeling down, depressed, or hopeless: Not at all (0) [de-identified] : at home uses gym  [de-identified] : healthy  [[id number]] : 0

## 2021-03-23 NOTE — ASSESSMENT
[FreeTextEntry1] :  gb polyp She needs to fu the size of the polyps and understands the importance  gallbladder polyps Surgery is necessary if polyps cause symptoms or are larger than 1 cm. Doctors also recommend surgery when a polyp has grown by 2 mm or more since the last checkup. True gallbladder polyps are rare, and they can cause gallbladder cancer. The treatment involves surgical removal of the gallbladder Very small polyps, those less than 1 centimeter (or less than 1.5 cm, according to some studies) may not need gallbladder removal surgery, and instead can be regularly monitored by scanning and re-evaluated for any suspicious changes that could indicate gallbladder cancer.\par  \par Polyps larger than 1 centimeter in size are more likely to become cancerous, especially those that are 1.5 centimeter across and larger — they have a 46 to 70 percent chance of containing cancer cells.\par  \par Monitoring for gallbladder polyps less than 1.5 centimeter should occur every three to six months for up to two years, after which it can be stopped if there have been no changes in the polyps. It isn't recommended that gallbladder polyps smaller than 0.5 centimeter across be treated by having the gallbladder removed. In gallbladder polyps that are that small, the risk of gallbladder cancer is extremely rare.\par  \par Gallbladder polyps that appear cancerous can be treated by surgical removal of the gallbladder. For larger gallbladder polyps, cholecystectomy may also be recommended to prevent the development of gallbladder cancer.\par   \par Deciding how to treat gallbladder polyps requires using thoughtful balance — weighing the potential risks of surgery against the potential risks of the development of gallbladder cancer. Paying attention to the overall cancer risk and considering careful monitoring of gallbladder polyps can be an effective treatment strategy to preserve your health\par 2 osteoporosis she needs to have repat bone density  Exercise may decrease fracture risk by improving bone mass in premenopausal women and helping to maintain bone density in women who have been through menopause. Furthermore, exercise can strengthen your muscles, improve your balance, and make you less likely to have a fall that could lead to fracture or other injury. Most experts recommend exercising for at least 30 minutes three times per week. Many different types of exercise, including resistance training (eg, using free weights or resistance bands), jogging, jumping, and walking, are effective.Falling significantly increases the risk of osteoporotic fractures in older adults. Taking measures to prevent falls can decrease the risk of fractures. Such measures may include the following:\par \par ?Removing loose rugs and electrical cords or any other loose items in the home that could lead to tripping, slipping, and falling.\par \par \par ?Providing adequate lighting in all areas inside and around the home, including stairwells and entrance ways.\par \par \par ?Avoiding walking on slippery surfaces, such as ice or wet or polished floors.\par \par \par ?Avoiding walking in unfamiliar areas outside.\par \par \par ?Reviewing drug regimens to replace medications that may increase the risk of falls with those that are less likely to do so.\par \par \par ?Visiting an ophthalmologist or optometrist regularly to check your vision.\par The relationship between bone density and fracture risk in premenopausal women (ie, those who have not yet gone through menopause) is not well defined. A premenopausal woman with low bone density may have little increased risk of fracture. Thus, bone density alone should not be used to diagnose osteoporosis in a premenopausal woman; further evaluation for other potential causes of bone loss is generally recommended.\par \par Bisphosphonates — Bisphosphonates are medications that slow the breakdown and removal of bone (ie, resorption). They are widely used for the prevention and treatment of osteoporosis in postmenopausal women.\par \par These drugs need to be taken first thing in the morning on an empty stomach with a full 8 oz glass of plain (not sparkling) water. You then need to wait for a half hour or an hour, depending on which one you take, before eating or taking any other medications:\par \par ?Alendronate (brand name: Fosamax) or risedronate (brand names: Actonel, Atelvia) – If you take either of these drugs, wait at least half an hour. (See 'Risedronate' below and 'Ibandronate' below.)\par \par \par ?Ibandronate (brand name: Boniva) – If you take this drug, wait at least one hour. (See 'Ibandronate' below.)\par \par \par These instructions help ensure that the drugs will be absorbed and also reduce the risk of side effects and potential complications.\par \par A "delayed-release" formulation of risedronate is also available. Unlike immediate-release risedronate and other oral bisphosphonates, delayed-release risedronate is taken immediately after breakfast and with at least 4 ounces of water.\par \par After taking any oral bisphosphonate, remain upright (sitting or standing) for at least 30 minutes to minimize the risk of acid reflux and other gastrointestinal side effects. (See 'Side effects of bisphosphonates' below.)\par \par If you are at high risk for breaking a bone, you can safely take osteoporosis medicines for many years. However, most people can stop taking alendronate, risedronate, or ibandronate after five years. This is because these drugs have residual benefit, even after you stop them. This approach also minimizes side effects from long-term use. Your doctor will continue to monitor your bone density to determine if you need to start medication again.\par \par Side effects of bisphosphonates — Most people who take bisphosphonates do not have any serious side effects related to the medication. However, it is important to closely follow the instructions for taking the medication; lying down or eating sooner than the recommended time after a dose increases the risk of stomach upset.\par \par There has been concern about use of bisphosphonates in people who require invasive dental work. A problem known as osteonecrosis of the jaw has developed in people who used bisphosphonates. The risk of this problem is very small in people who take bisphosphonates for osteoporosis prevention and treatment. However, there is a slightly higher risk of this problem when higher doses of bisphosphonates are given into a vein during cancer treatment.\par \par For people who take bisphosphonates to treat osteoporosis, experts do not think that it is necessary to stop the medication before invasive dental work (eg, tooth extraction or implant). However, people who take a bisphosphonate as part of a treatment for cancer should consult their doctor before having invasive dental work.\par \par Taking bisphosphonates for a long time (eg, seven years or longer) can rarely increase the risk of an unusual type of femur (thigh bone) fracture. Taking bisphosphonates for up to five years for osteoporosis (the usual duration of treatment) is usually not associated with these "atypical" fractures, and the benefits outweigh the risk of this rare side effect.\par \par Alendronate — Alendronate (brand names: Binosto, Fosamax) reduces the risk of vertebral and hip fractures, and it decreases the loss of height associated with vertebral fractures. It is available as a pill that is taken once per day or once per week.\par \par Risedronate — Risedronate (brand names: Actonel, Atelvia) reduces the risk of both vertebral and hip fractures. Risedronate is approved for both prevention and treatment of osteoporosis. It can be taken once per day, once per week, or once per month.\par \par Ibandronate — Although ibandronate reduces the risk of bone loss and vertebral fractures, there is no proof that it reduces the risk of hip fractures, so it is not recommended as often as alendronate and risedronate. Ibandronate (brand name: Boniva) can be used for prevention and treatment of osteoporosis. It is available as a pill that is taken once per day or once per month. It is also available as an injection that is given into a vein once every three months.\par \par Zoledronic acid — A once-yearly, intravenous dose of zoledronic acid (sample brand name: Reclast) is also available for the treatment of osteoporosis. This medication is given into a vein (by "IV") over 15 minutes and is usually well tolerated. Zoledronic acid can improve bone density and decrease the risk of vertebral and hip fractures.\par \par Side effects of zoledronic acid can include flu-like symptoms within 24 to 72 hours of the first dose. This may include a low-grade fever and muscle and joint pain. Treatment with a fever-reducing medication (acetaminophen) generally improves the symptoms. Subsequent doses typically cause milder symptoms.\par \par Intravenous zoledronic acid is an appealing alternative for people who cannot tolerate oral bisphosphonates or who prefer a once yearly to a monthly, weekly, or daily regimen. Zoledronic acid is usually given for three years and then discontinued. Your doctor will monitor your bone density to see if it needs to be restarted.\par \par "Estrogen-like" medications — Certain medications, known as selective estrogen receptor modulators (SERMs), produce some estrogen-like effects in the bone. These medications, which include raloxifene (brand name: Evista) and tamoxifen, provide protection against postmenopausal bone loss. In addition, SERMs decrease the risk of breast cancer in women who are at high risk. Raloxifene can be used for the prevention and treatment of osteoporosis in postmenopausal women, although it may be less effective in preventing bone loss than bisphosphonates or estrogen (see 'Hormone therapy' below). Tamoxifen is usually given to women with breast cancer to reduce the risk of recurrence, or to women who have never had breast cancer but are at high risk of developing it. (See "Patient education: Medications for the prevention of breast cancer (Beyond the Basics)".)\par \par SERMs are not recommended for premenopausal women.\par \par Hormone therapy — In the past, hormone therapy with estrogen or estrogen-progestin was conside \par 3 hematuria repeat us

## 2021-03-23 NOTE — PHYSICAL EXAM
[Well Developed] : well developed [Well Nourished] : well nourished [Normal Voice Quality] : was normal [Normal Verbal Skills] : the patient had normal verbal communication skills [Normal Nonverbal Skills] : normal nonverbal communication skills were demonstrated [Normal Sclera/Conjunctiva] : normal sclera/conjunctiva [PERRL] : pupils equal round and reactive to light [EOMI] : extraocular movements intact [Normal Oropharynx] : the oropharynx was normal [No JVD] : no jugular venous distention [No Lymphadenopathy] : no lymphadenopathy [Supple] : supple [Normal Rate] : normal rate  [Regular Rhythm] : with a regular rhythm [Normal S1, S2] : normal S1 and S2 [No Edema] : there was no peripheral edema [No Extremity Clubbing/Cyanosis] : no extremity clubbing/cyanosis [Normal Posterior Cervical Nodes] : no posterior cervical lymphadenopathy [Normal Anterior Cervical Nodes] : no anterior cervical lymphadenopathy [Normal] : affect was normal and insight and judgment were intact

## 2021-03-24 LAB
25(OH)D3 SERPL-MCNC: 47.8 NG/ML
ALBUMIN SERPL ELPH-MCNC: 4.7 G/DL
ALP BLD-CCNC: 45 U/L
ALT SERPL-CCNC: 57 U/L
ANION GAP SERPL CALC-SCNC: 12 MMOL/L
APPEARANCE: CLEAR
AST SERPL-CCNC: 28 U/L
BASOPHILS # BLD AUTO: 0.04 K/UL
BASOPHILS NFR BLD AUTO: 0.7 %
BILIRUB SERPL-MCNC: 0.3 MG/DL
BILIRUBIN URINE: NEGATIVE
BLOOD URINE: NEGATIVE
BUN SERPL-MCNC: 15 MG/DL
CALCIUM SERPL-MCNC: 9.3 MG/DL
CHLORIDE SERPL-SCNC: 103 MMOL/L
CHOLEST SERPL-MCNC: 251 MG/DL
CO2 SERPL-SCNC: 28 MMOL/L
COLOR: NORMAL
CREAT SERPL-MCNC: 0.54 MG/DL
EOSINOPHIL # BLD AUTO: 0.07 K/UL
EOSINOPHIL NFR BLD AUTO: 1.2 %
GLUCOSE QUALITATIVE U: NEGATIVE
GLUCOSE SERPL-MCNC: 92 MG/DL
HCT VFR BLD CALC: 40.5 %
HDLC SERPL-MCNC: 54 MG/DL
HGB BLD-MCNC: 12.8 G/DL
IMM GRANULOCYTES NFR BLD AUTO: 0.2 %
KETONES URINE: NEGATIVE
LDLC SERPL CALC-MCNC: 169 MG/DL
LEUKOCYTE ESTERASE URINE: NEGATIVE
LYMPHOCYTES # BLD AUTO: 2.34 K/UL
LYMPHOCYTES NFR BLD AUTO: 40.7 %
MAN DIFF?: NORMAL
MCHC RBC-ENTMCNC: 30.4 PG
MCHC RBC-ENTMCNC: 31.6 GM/DL
MCV RBC AUTO: 96.2 FL
MONOCYTES # BLD AUTO: 0.53 K/UL
MONOCYTES NFR BLD AUTO: 9.2 %
NEUTROPHILS # BLD AUTO: 2.76 K/UL
NEUTROPHILS NFR BLD AUTO: 48 %
NITRITE URINE: NEGATIVE
NONHDLC SERPL-MCNC: 197 MG/DL
PH URINE: 6
PLATELET # BLD AUTO: 350 K/UL
POTASSIUM SERPL-SCNC: 4.1 MMOL/L
PROT SERPL-MCNC: 6.9 G/DL
PROTEIN URINE: NEGATIVE
RBC # BLD: 4.21 M/UL
RBC # FLD: 14.7 %
SODIUM SERPL-SCNC: 143 MMOL/L
SPECIFIC GRAVITY URINE: 1.02
TRIGL SERPL-MCNC: 140 MG/DL
UROBILINOGEN URINE: NORMAL
WBC # FLD AUTO: 5.75 K/UL

## 2021-06-07 ENCOUNTER — OUTPATIENT (OUTPATIENT)
Dept: OUTPATIENT SERVICES | Facility: HOSPITAL | Age: 61
LOS: 1 days | End: 2021-06-07

## 2021-06-07 ENCOUNTER — RESULT REVIEW (OUTPATIENT)
Age: 61
End: 2021-06-07

## 2021-06-07 ENCOUNTER — APPOINTMENT (OUTPATIENT)
Dept: MAMMOGRAPHY | Facility: CLINIC | Age: 61
End: 2021-06-07
Payer: COMMERCIAL

## 2021-06-07 ENCOUNTER — APPOINTMENT (OUTPATIENT)
Dept: RADIOLOGY | Facility: CLINIC | Age: 61
End: 2021-06-07
Payer: COMMERCIAL

## 2021-06-07 ENCOUNTER — APPOINTMENT (OUTPATIENT)
Dept: ULTRASOUND IMAGING | Facility: CLINIC | Age: 61
End: 2021-06-07
Payer: COMMERCIAL

## 2021-06-07 PROCEDURE — 77063 BREAST TOMOSYNTHESIS BI: CPT | Mod: 26

## 2021-06-07 PROCEDURE — 77085 DXA BONE DENSITY AXL VRT FX: CPT | Mod: 26

## 2021-06-07 PROCEDURE — 77067 SCR MAMMO BI INCL CAD: CPT | Mod: 26

## 2021-06-07 PROCEDURE — 76700 US EXAM ABDOM COMPLETE: CPT | Mod: 26

## 2021-06-16 ENCOUNTER — APPOINTMENT (OUTPATIENT)
Dept: INTERNAL MEDICINE | Facility: CLINIC | Age: 61
End: 2021-06-16
Payer: COMMERCIAL

## 2021-06-16 VITALS
TEMPERATURE: 97.3 F | HEIGHT: 58 IN | HEART RATE: 69 BPM | SYSTOLIC BLOOD PRESSURE: 133 MMHG | BODY MASS INDEX: 19.67 KG/M2 | OXYGEN SATURATION: 98 % | WEIGHT: 93.7 LBS | DIASTOLIC BLOOD PRESSURE: 81 MMHG

## 2021-06-16 DIAGNOSIS — Z23 ENCOUNTER FOR IMMUNIZATION: ICD-10-CM

## 2021-06-16 DIAGNOSIS — L65.9 NONSCARRING HAIR LOSS, UNSPECIFIED: ICD-10-CM

## 2021-06-16 DIAGNOSIS — Z86.2 PERSONAL HISTORY OF DISEASES OF THE BLOOD AND BLOOD-FORMING ORGANS AND CERTAIN DISORDERS INVOLVING THE IMMUNE MECHANISM: ICD-10-CM

## 2021-06-16 PROCEDURE — 99396 PREV VISIT EST AGE 40-64: CPT

## 2021-06-16 PROCEDURE — 99072 ADDL SUPL MATRL&STAF TM PHE: CPT

## 2021-06-16 NOTE — HEALTH RISK ASSESSMENT
[Good] : ~his/her~  mood as  good [No] : In the past 12 months have you used drugs other than those required for medical reasons? No [No falls in past year] : Patient reported no falls in the past year [0] : 2) Feeling down, depressed, or hopeless: Not at all (0) [Patient reported mammogram was normal] : Patient reported mammogram was normal [Patient reported PAP Smear was normal] : Patient reported PAP Smear was normal [Patient reported bone density results were abnormal] : Patient reported bone density results were abnormal [Patient reported colonoscopy was abnormal] : Patient reported colonoscopy was abnormal [HIV Test offered] : HIV Test offered [Hepatitis C test offered] : Hepatitis C test offered [None] : None [With Family] : lives with family [# of Members in Household ___] :  household currently consist of [unfilled] member(s) [Employed] : employed [Graduate School] : graduate school [] :  [# Of Children ___] : has [unfilled] children [Sexually Active] : sexually active [Feels Safe at Home] : Feels safe at home [Fully functional (bathing, dressing, toileting, transferring, walking, feeding)] : Fully functional (bathing, dressing, toileting, transferring, walking, feeding) [Fully functional (using the telephone, shopping, preparing meals, housekeeping, doing laundry, using] : Fully functional and needs no help or supervision to perform IADLs (using the telephone, shopping, preparing meals, housekeeping, doing laundry, using transportation, managing medications and managing finances) [Smoke Detector] : smoke detector [Carbon Monoxide Detector] : carbon monoxide detector [Safety elements used in home] : safety elements used in home [Seat Belt] :  uses seat belt [Sunscreen] : uses sunscreen [FreeTextEntry1] : none  [] : No [de-identified] : once a week   [de-identified] : regular  [FIT7Jwrjn] : 0 [Change in mental status noted] : No change in mental status noted [Language] : denies difficulty with language [Behavior] : denies difficulty with behavior [Learning/Retaining New Information] : denies difficulty learning/retaining new information [Handling Complex Tasks] : denies difficulty handling complex tasks [Reasoning] : denies difficulty with reasoning [Spatial Ability and Orientation] : denies difficulty with spatial ability and orientation [Reports changes in hearing] : Reports no changes in hearing [Reports changes in vision] : Reports no changes in vision [Reports changes in dental health] : Reports no changes in dental health [Guns at Home] : no guns at home [Travel to Developing Areas] : does not  travel to developing areas [TB Exposure] : is not being exposed to tuberculosis [Caregiver Concerns] : does not have caregiver concerns [MammogramDate] : 6/21 [BoneDensityDate] : 6/21 [EllenDate] : 2yrs ago  [BoneDensityComments] : osteoporosis  [ColonoscopyDate] : 10/18  [ColonoscopyComments] : tubular adenoma  [de-identified] : last eye exam 1/21  [FreeTextEntry2] : nurse [de-identified] : last exam 3/21 [AdvancecareDate] : 6/21

## 2021-06-16 NOTE — HISTORY OF PRESENT ILLNESS
[FreeTextEntry1] : cpe  [de-identified] : Pt is a 60 yrold woman with osteoporosis  , gb polyp and diverticulosis  and colon polyps who is here for her cpe.   She has been on alendronate for  three years and has not had an improvement of her bone density but it has not worsened.  She would like to consider prolia.   She had gb us and no change in her gb polyps .  She had colonoscopy in 2018 and had several polyps and needs repeat in Oct.

## 2021-06-16 NOTE — COUNSELING
[Fall prevention counseling provided] : Fall prevention counseling provided [Adequate lighting] : Adequate lighting [No throw rugs] : No throw rugs [Use proper foot wear] : Use proper foot wear [Use recommended devices] : Use recommended devices [Sleep ___ hours/day] : Sleep [unfilled] hours/day [Engage in a relaxing activity] : Engage in a relaxing activity [Plan in advance] : Plan in advance [____ min/wk Activity] : [unfilled] min/wk activity [FreeTextEntry2] : ideal 104- 119  she is 89 bmi 19

## 2021-06-16 NOTE — ASSESSMENT
[FreeTextEntry1] : health She is up to date with  vaccines She had dtap 2017  she is up to date with mammogram bone density , eye and dental exams and colon cancer screening.  \par 2 bmi 18 healthy eating increase exercise  \par 3 gb polyps /fu in one year presently stable and understand risks of gb polyps when size increases and polyps of 10mm \par 4 osteoporosis  Will refer to rheum for possible Prolia   She will continue alendronate for now.  Make your home safer – To avoid falling at home, get rid of things that might make you trip or slip. This might include furniture, electrical cords, clutter, and loose rugs Keep your home well-lit so that you can easily see where you are going. Avoid storing things in high places so you don't have to reach or climb.\par ?Wear sturdy shoes that fit well – Wearing shoes with high heels or slippery soles, or shoes that are too loose, can lead to falls. Walking around in bare feet, or only socks, can also increase your risk of falling.\par ?Take vitamin D pills – Taking vitamin D might lower the risk of falls in older people. This is because vitamin D helps make bones and muscles stronger. Your doctor can talk to you about whether you should take extra vitamin D, and how much.\par ?Stay active – Exercising on a regular basis can help lower your risk of falling. It might also help prevent you from getting hurt if you do fall. It is best to do a few different activities that help with both strength and balance. There are many kinds of exercise that can be safe for older people. These include walking, swimming, and Kingsley Chi (a Chinese martial art that involves slow, gentle movements).\par ?Use a cane, walker, and other safety devices – If your doctor recommends that you use a cane or walker, be sure that it's the right size and you know how to use it. There are other devices that might help you avoid falling, too. These include grab bars or a sturdy seat for the shower, non-slip bath mats, and hand rails or treads for the stairs (to prevent slipping).\par If you worry that you could fall, there are also alarm buttons that let you call for help if you fall and can't get up.\par 5. elevated alt pt states she had just returned from vacation and had been eating and drinking   will repeat \par 6  hld repeat levels pt had returned form vacation.  low fat diet \par 7 tubular adenoma  repeat colonoscopy in Oct  \par 8 We discussed hair phases , use of alcohol hair products hair sprays and gells  and to avoid sulfate containing shampoos , stop bleaching hair don’t pull it back in band.  Biotin hair supplement.  protect hair fun sun and chlorine and pools beach water.  Life vicissitudes, including illness, emotional trauma, protein deprivation (during strict dieting), and hormonal changes like those in pregnancy, puberty, and menopause may cause hair loss. Several health conditions, including thyroid disease, iron deficiency anemia, and secondary syphilis, can cause hair loss.1.Regularly wash your hair with mild shampoo. ... \par 2.Vitamin for hair loss. ... \par 3.Enrich diet with protein. ... \par 4.Scalp massage with essential oils. ... \par 5.Avoid brushing wet hair. ... \par 6.Garlic juice, onion juice or guzman juice. ... \par 7.Keep yourself hydrated. ... \par 8.Rub green tea into your hair.\par Vitamin D deficiency has been linked to alopecia, the autoimmune condition that causes bald patches on the scalp and other areas of the body. Both men and women can experience alopecia. Another study found that women 18 to 45 years old who experienced alopecia or other types of hair loss had low levels of vitamin D.After two or three months, this dormant hair falls out and its follicles begin growing new hair as other follicles begin a dormant phase. Shedding hair is different from hair loss, when a hair falls out and doesn't grow back. ... Depending on the degree of damage, resulting hair loss can be permanent According to the [employer], it's normal to lose anywhere from 50 to 100 strands of hair per day. For people with longer hair strands, losing them may be more noticeableTelogen effluvium is a phenomenon that occurs after pregnancy, major surgery, drastic weight loss, or extreme stress, in which you shed large amounts of hair every day, usually when shampooing, styling, or brushing. ... At its peak, you may lose handfuls of hair\par What is the difference between hair shedding and hair loss?\par Unlike hair shedding, hair loss occurs when something stops hairs from growing, which is a condition known as anagen effluvium. The most common causes of hair loss include: ... Hairstyles that pull on the hair or harsh hair care products. Compulsion to pull out hair. She can use Folexin vitamin\par \par

## 2021-06-16 NOTE — PHYSICAL EXAM
[Well Developed] : well developed [Well Nourished] : well nourished [Normal Voice Quality] : was normal [Normal Verbal Skills] : the patient had normal verbal communication skills [Normal Nonverbal Skills] : normal nonverbal communication skills were demonstrated [Conjunctiva] : the conjunctiva were normal in both eyes [PERRL] : pupils were equal in size, round, and reactive to light [EOM Intact] : extraocular movements were intact [Cataract Right Eye] : a cataract was noted in the right eye [Cataract Left Eye] : a cataract was noted in the left eye [Normal Appearance] : was normal in appearance [Neck Supple] : was supple [Enlarged Diffusely] : was not enlarged [JVP Elevated ___cm] : the JVP was not elevated [Rate ___] : at [unfilled] breaths per minute [Normal Rhythm/Effort] : normal respiratory rhythm and effort [Clear Bilaterally] : the lungs were clear to auscultation bilaterally [Normal to Percussion] : the lungs were normal to percussion [5th Left ICS - MCL] : palpated at the 5th LICS in the midclavicular line [Heart Rate ___] : [unfilled] bpm [Rhythm Regular] : regular [Normal Rate] : normal [Normal S1] : normal S1 [Normal S2] : normal S2 [S3] : no S3 [S4] : no S4 [No Murmur] : no murmurs heard [No Pitting Edema] : no pitting edema present [Rt] : no varicose veins of the right leg [Lt] : no varicose veins of the left leg [Left Carotid Bruit] : no bruit heard over the left carotid [Right Carotid Bruit] : no bruit heard over the right carotid [Right Femoral Bruit] : no bruit heard over the right femoral artery [Left Femoral Bruit] : no bruit heard over the left femoral artery [2+] : left 2+ [No Abnormalities] : the abdominal aorta was not enlarged and no bruit was heard [Bruit] : no bruit heard [Examination Of The Breasts] : a normal appearance [No Discharge] : no discharge [Soft, Nontender] : the abdomen was soft and nontender [No Mass] : no masses were palpated [No HSM] : no hepatosplenomegaly noted [None] : no CVA tenderness [Postauricular Lymph Nodes Enlarged Bilaterally] : nodes not enlarged [Preauricular Lymph Nodes Enlarged Bilaterally] : nodes not enlarged [Submandibular Lymph Nodes Enlarged Bilaterally] : nodes not enlarged [Suboccipital Lymph Nodes Enlarged Bilaterally] : nodes not enlarged [Submental Lymph Nodes Enlarged] : nodes not enlarged [Cervical Lymph Nodes Enlarged Anterior Bilaterally] : nodes not enlarged [Cervical Lymph Nodes Enlarged Posterior Bilaterally] : nodes not enlarged [Supraclavicular Lymph Nodes Enlarged Bilaterally] : nodes not enlarged [Axillary Lymph Nodes Enlarged Bilaterally] : nodes not enlarged [Femoral Lymph Nodes Enlarged Bilaterally] : nodes not enlarged [Epitrochlear Lymph Nodes Enlarged Bilaterally] : nodes not enlarged [Inguinal Lymph Nodes Enlarged Bilaterally] : nodes not enlarged [No Lymphangitis] : no lymphangitis observed [Normal Kyphosis] : normal kyphosis [No Visual Abnormalities] : no visible abnormalities [No Scoliosis] : no scoliosis [Normal Lordosis] : normal lordosis [No Tenderness to Palpation] : no spine tenderness on palpation [No Masses] : no masses [Full ROM] : full ROM [No Pain with ROM] : no pain with motion in any direction [Intact] : all reflexes within normal limits bilaterally [Normal Station and Gait] : the gait and station were normal [Normal Motor Tone] : the muscle tone was normal [Involuntary Movements] : no involuntary movements were seen [Normal Scalp] : inspection of the scalp showed no abnormalities [Examination Of The Hair] : texture and distribution of hair was normal [Abnormal Color] : normal color and pigmentation [Complexion Medium] : medium complexion [Skin Lesions 1] : no skin lesions were observed [Tattoo - Single] : no tattoos observed [Skin Turgor Decreased] : normal skin turgor [Normal] : the deep tendon reflexes were normal [Normal Mental Status] : the patient's orientation, memory, attention, language and fund of knowledge were normal [Appropriate] : appropriate [Impaired judgment] : intact judgment [Impaired Insight] : intact insight [de-identified] : tongue normal teeth in good repair

## 2021-06-17 LAB
ALBUMIN SERPL ELPH-MCNC: 4.8 G/DL
ALP BLD-CCNC: 41 U/L
ALT SERPL-CCNC: 20 U/L
AST SERPL-CCNC: 21 U/L
BILIRUB DIRECT SERPL-MCNC: 0.1 MG/DL
BILIRUB INDIRECT SERPL-MCNC: 0.6 MG/DL
BILIRUB SERPL-MCNC: 0.7 MG/DL
CHOLEST SERPL-MCNC: 268 MG/DL
HDLC SERPL-MCNC: 51 MG/DL
LDLC SERPL CALC-MCNC: 194 MG/DL
NONHDLC SERPL-MCNC: 217 MG/DL
PROT SERPL-MCNC: 7.1 G/DL
TRIGL SERPL-MCNC: 113 MG/DL

## 2021-08-07 ENCOUNTER — RX RENEWAL (OUTPATIENT)
Age: 61
End: 2021-08-07

## 2021-10-05 ENCOUNTER — APPOINTMENT (OUTPATIENT)
Dept: INTERNAL MEDICINE | Facility: CLINIC | Age: 61
End: 2021-10-05
Payer: COMMERCIAL

## 2021-10-05 VITALS
HEIGHT: 58 IN | SYSTOLIC BLOOD PRESSURE: 127 MMHG | DIASTOLIC BLOOD PRESSURE: 82 MMHG | TEMPERATURE: 92.9 F | WEIGHT: 93 LBS | OXYGEN SATURATION: 98 % | HEART RATE: 70 BPM | BODY MASS INDEX: 19.52 KG/M2

## 2021-10-05 PROCEDURE — 93000 ELECTROCARDIOGRAM COMPLETE: CPT

## 2021-10-05 PROCEDURE — 99215 OFFICE O/P EST HI 40 MIN: CPT | Mod: 25

## 2021-10-05 NOTE — RESULTS/DATA
[ECG Reviewed] : reviewed [Normal] : The 12 - lead ECG is normal [NSR] : normal sinus rhythm [Ventricular Rate___] : ventricular rate is [unfilled] beats per minute [P Waves Normal] : the P wave is normal [ECG Intervals WY.] : [place] interval is normal [MD Interval___] : [unfilled] seconds [Normal QRS] : the QRS is normal [QRS Interval___] : QRS interval: [unfilled] seconds [ECG Axis] : the QRS axis is normal [QTc Interval___] : QTc interval: [unfilled] [Normal ST Segments] : the ST segments are normal [ECG T. Waves] : normal [No Interval Change] : no interval change

## 2021-10-05 NOTE — COUNSELING
[Weight management counseling provided] : Weight management [Healthy eating counseling provided] : healthy eating [Activity counseling provided] : activity [Target Wt Loss Goal ___] : Target weight loss goal [unfilled] lbs [Sleep ___ hours/day] : Sleep [unfilled] hours/day

## 2021-10-06 LAB
ALBUMIN SERPL ELPH-MCNC: 4.7 G/DL
ALP BLD-CCNC: 48 U/L
ALT SERPL-CCNC: 22 U/L
ANION GAP SERPL CALC-SCNC: 12 MMOL/L
APPEARANCE: CLEAR
AST SERPL-CCNC: 37 U/L
BASOPHILS # BLD AUTO: 0.05 K/UL
BASOPHILS NFR BLD AUTO: 1.1 %
BILIRUB SERPL-MCNC: 0.6 MG/DL
BILIRUBIN URINE: NEGATIVE
BLOOD URINE: NEGATIVE
BUN SERPL-MCNC: 10 MG/DL
CALCIUM SERPL-MCNC: 9.6 MG/DL
CHLORIDE SERPL-SCNC: 105 MMOL/L
CO2 SERPL-SCNC: 23 MMOL/L
COLOR: YELLOW
CREAT SERPL-MCNC: 0.49 MG/DL
EOSINOPHIL # BLD AUTO: 0.09 K/UL
EOSINOPHIL NFR BLD AUTO: 2.1 %
GLUCOSE QUALITATIVE U: NEGATIVE
GLUCOSE SERPL-MCNC: 99 MG/DL
HCT VFR BLD CALC: 39.6 %
HGB BLD-MCNC: 12.6 G/DL
IMM GRANULOCYTES NFR BLD AUTO: 0.2 %
KETONES URINE: NEGATIVE
LEUKOCYTE ESTERASE URINE: NEGATIVE
LYMPHOCYTES # BLD AUTO: 0.61 K/UL
LYMPHOCYTES NFR BLD AUTO: 13.9 %
MAN DIFF?: NORMAL
MCHC RBC-ENTMCNC: 30.6 PG
MCHC RBC-ENTMCNC: 31.8 GM/DL
MCV RBC AUTO: 96.1 FL
MONOCYTES # BLD AUTO: 0.35 K/UL
MONOCYTES NFR BLD AUTO: 8 %
NEUTROPHILS # BLD AUTO: 3.28 K/UL
NEUTROPHILS NFR BLD AUTO: 74.7 %
NITRITE URINE: NEGATIVE
PH URINE: 7
PLATELET # BLD AUTO: 281 K/UL
POTASSIUM SERPL-SCNC: 4.9 MMOL/L
PROT SERPL-MCNC: 6.9 G/DL
PROTEIN URINE: NORMAL
RBC # BLD: 4.12 M/UL
RBC # FLD: 13.6 %
SODIUM SERPL-SCNC: 140 MMOL/L
SPECIFIC GRAVITY URINE: 1.02
UROBILINOGEN URINE: NORMAL
WBC # FLD AUTO: 4.39 K/UL

## 2021-10-08 DIAGNOSIS — Z01.818 ENCOUNTER FOR OTHER PREPROCEDURAL EXAMINATION: ICD-10-CM

## 2021-10-12 ENCOUNTER — APPOINTMENT (OUTPATIENT)
Dept: DISASTER EMERGENCY | Facility: CLINIC | Age: 61
End: 2021-10-12

## 2021-10-13 LAB — SARS-COV-2 N GENE NPH QL NAA+PROBE: NOT DETECTED

## 2021-10-13 NOTE — HISTORY OF PRESENT ILLNESS
[No Pertinent Cardiac History] : no history of aortic stenosis, atrial fibrillation, coronary artery disease, recent myocardial infarction, or implantable device/pacemaker [No Pertinent Pulmonary History] : no history of asthma, COPD, sleep apnea, or smoking [No Adverse Anesthesia Reaction] : no adverse anesthesia reaction in self or family member [(Patient denies any chest pain, claudication, dyspnea on exertion, orthopnea, palpitations or syncope)] : Patient denies any chest pain, claudication, dyspnea on exertion, orthopnea, palpitations or syncope [Chronic Anticoagulation] : no chronic anticoagulation [Chronic Kidney Disease] : no chronic kidney disease [Diabetes] : no diabetes [FreeTextEntry1] : colonoscopy [FreeTextEntry2] : 10/15/21 [FreeTextEntry3] : Ray [FreeTextEntry4] : Pt is a 60 yrold woman with osteoporosis , gb polyp and diverticulosis and colon polyps who is here for medical clearance for repeat colonoscopy

## 2021-10-13 NOTE — ASSESSMENT
[High Risk Surgery - Intraperitoneal, Intrathoracic or Supringuinal Vascular Procedures] : High Risk Surgery - Intraperitoneal, Intrathoracic or Supringuinal Vascular Procedures - No (0) [Ischemic Heart Disease] : Ischemic Heart Disease - No (0) [Congestive Heart Failure] : Congestive Heart Failure - No (0) [Prior Cerebrovascular Accident or TIA] : Prior Cerebrovascular Accident or TIA - No (0) [Creatinine >= 2mg/dL (1 Point)] : Creatinine >= 2mg/dL - No (0) [Insulin-dependent Diabetic (1 Point)] : Insulin-dependent Diabetic - No (0) [0] : 0 , RCRI Class: I, Risk of Post-Op Cardiac Complications: 3.9%, 95% CI for Risk Estimate: 2.8% - 5.4% [As per surgery] : as per surgery [FreeTextEntry4] : Pt is having a low risk procedure and is low risks for cardiac adverse outcome and cri is 0.4% .  she was explained the procedures  colonoscopy, anesthesia   risks and complications alternatives , prep and post procedure care.  I have answered all her questions.  she will have blood testing today  . The risks, benefits, and alternatives were explained in detail to the patient. Risks including but not limited to bleeding, perforation, adverse reaction to medications, cardiopulmonary compromise and their attendant sequalae explained. Sequelae including but not limited to need for surgery, colostomy, prolonged hospital stay, placement of drainage tubes, blood transfusions, disability, morbidity and mortality was explained. \par It has been made clear to the patient that although colonoscopy is still considered the best test to screen for colon cancer and polyps, no test is 100% accurate. He/she indicated understanding of the above and wishes to proceed. \par All questions and concerns have been addressed. \par \par \par  or growths (called polyps) that might become cancer. It is done in people who have no symptoms and no reason to think they have cancer. The goal is to find and remove polyps before they become cancer, or to find cancer early, before it grows, spreads, or causes problems.\par \par The colon and rectum are the last part of the digestive tract . When doctors talk about colon and rectal cancer screening, they use the term "colorectal." That is just a shorter way of saying "colon and rectal." It's also possible to say just colon cancer screening.\par \par Studies show that having colon cancer screening lowers the chance of dying from colon cancer. There are several different types of screening test that can do this.\par \par What are the different screening tests for colon cancer? — They include:\par \par Colonoscopy – Colonoscopy allows the doctor to see directly inside the entire colon. Before you can have a colonoscopy, you must clean out your colon. You do this at home by drinking a special liquid that causes watery diarrhea for several hours. On the day of the test, you get medicine to help you relax. Then a doctor puts a thin tube into your anus and advances it into your colon (figure 2). The tube has a tiny camera attached to it, so the doctor can see inside your colon. The tube also has tiny tools on the end, so the doctor can remove pieces of tissue or polyps if they are there. After polyps or pieces of tissue are removed, they are sent to a lab to be checked for cancer.\par \par \par •Advantages of this test – Colonoscopy finds most small polyps and almost all large polyps and cancers. If found, polyps can be removed right away. This test gives the most accurate results. If any other screening tests are done first and come back positive, a colonoscopy will need to be done for follow-up. If you have a colonoscopy as your first test, you will probably not need a second follow-up test soon after.\par \par \par •Drawbacks to this test – Colonoscopy has some small risks. It can cause bleeding or tear the inside of the colon, but this only happens in 1 out of 1,000 people. Also, cleaning out the bowel beforehand can be unpleasant. Plus, people usually cannot work or drive for the rest of the day after the test, because of the relaxation medicine they must take during the test.\par \par \par Sigmoidoscopy – A sigmoidoscopy is similar to a colonoscopy. The difference is that this test looks only at the last part of the colon, and a colonoscopy looks at the whole colon. Before you have a sigmoidoscopy, you must clean out the lower part of your colon using an enema. This bowel cleaning is not as thorough or unpleasant as the one for colonoscopy. For this test, you do not need to take medicines to help you relax, so you can drive and work afterward if you want.\par \par \par •Advantages of this test – Sigmoidoscopy can find polyps and cancers in the rectum and the last part of the colon. If polyps are found, they can be removed right away.\par \par \par •Drawbacks to this test – In about 2 out of 10,000 people, sigmoidoscopy tears the inside of the colon. The test also can't find polyps or cancers that are in the part of the colon the test does not view . If doctors find polyps or cancer during a sigmoidoscopy, they usually follow up with a colonoscopy.\par \par \par CT colonography (also known as virtual colonoscopy or CTC) – CTC looks for cancer and polyps using a special X-ray called a "CT scan." For most CTC tests, the preparation is the same as it is for colonoscopy.\par \par \par •Advantages of this test – CTC can find polyps and cancers in the whole colon without the need for medicines to relax.\par \par \par •Drawbacks to this test – If doctors find polyps or cancer with CTC, they usually follow up with a colonoscopy. CTC sometimes finds areas that look abnormal but that turn out to be healthy. This means that CTC can lead to tests and procedures you did not need. Plus, CTC exposes you to radiation. In most cases, the preparation needed to clean the bowel is the same as the one needed for a colonoscopy. The test is expensive, and some insurance companies might not cover this test for screening.\par \par \par Stool test for blood – "Stool" is another word for bowel movements. Stool tests most commonly check for blood in samples of stool. Cancers and polyps can bleed, and if they bleed around the time you do the stool test, then blood will show up on the test. The test can find even small amounts of blood that you can't see in your stool. Other less serious conditions can also cause small amounts of blood in the stool, and that will show up in this test. You will have to collect small samples from your bowel movements, which you will put in a special container you get from your doctor or nurse. Then you follow the instructions to mail the container out for the testing.\par \par \par •Advantages of this test – This test does not involve cleaning out the colon or having any procedures.\par \par \par •Drawbacks to this test – Stool tests are less likely to find polyps than other screening tests. These tests also often come up abnormal even in people who do not have cancer. If a stool test shows something abnormal, doctors usually follow up with a colonoscopy.\par \par \par Stool DNA test – The stool DNA test checks for genetic markers of cancer, as well as for signs of blood. For this test, you get a special kit in order to collect a whole bowel movement. Then you follow the instructions about how and where to ship it.\par \par \par •Advantages of this test – This test does not involve cleaning out the colon or having any procedures. When cancer is not present, it is less likely to be falsely abnormal than a stool test for blood. That means it leads to fewer unnecessary colonoscopies.\par \par \par •Drawbacks to this test – It might be unpleasant to collect and ship a whole bowel movement. If a DNA test shows something abnormal, doctors usually follow up with a colonoscopy.\par \par \par There is no blood test that most experts think is accurate enough to use for screening.\par \par How do I choose which test to have? — Work with your doctor or nurse to decide which test is best for you. Some doctors might choose to combine screening tests, for example, sigmoidoscopy plus stool testing for blood. Being screened–no matter how–is more important than which test you choose.\par \par Who should be screened for colon cancer? — Doctors recommend that most people begin having colon cancer screening at age 50. People who have an increased risk of getting colon cancer sometimes begin screening at a younger age. That might include people with a strong family history of colon cancer, and people with diseases of the colon called "Crohn's disease" and "ulcerative colitis."\par \par Most people can stop being screened around the age of 75, or at the latest 85.\par \par How often should I be screened? — That depends on your risk of colon cancer and which test you have. People who have a high risk of colon cancer often need to be tested more often and should have a colonoscopy.\par \par Most people are not at high risk, so they can choose one of these schedules:\par \par Colonoscopy every 10 years\par \par CT colonography (CTC) every 5 years\par \par Sigmoidoscopy every 5 to 10 years\par \par Stool testing for blood once a year\par \par Stool DNA testing every 3 years (but doctors are not yet sure of the best time frame)\par \par \par  [FreeTextEntry7] : none

## 2021-10-13 NOTE — PHYSICAL EXAM
[Well Developed] : well developed [Well Nourished] : well nourished [Normal Sclera/Conjunctiva] : normal sclera/conjunctiva [PERRL] : pupils equal round and reactive to light [Normal Oropharynx] : the oropharynx was normal [No JVD] : no jugular venous distention [No Lymphadenopathy] : no lymphadenopathy [Supple] : supple [Rate ___] : at [unfilled] breaths per minute [Normal Rhythm/Effort] : normal respiratory rhythm and effort [Clear Bilaterally] : the lungs were clear to auscultation bilaterally [Normal to Percussion] : the lungs were normal to percussion [Heart Rate ___] : [unfilled] bpm [5th Left ICS - MCL] : palpated at the 5th LICS in the midclavicular line [Normal Rate] : normal [Rhythm Regular] : regular [Normal S1] : normal S1 [Normal S2] : normal S2 [No Murmur] : no murmurs heard [No Pitting Edema] : no pitting edema present [2+] : left 2+ [No Abnormalities] : the abdominal aorta was not enlarged and no bruit was heard [Soft, Nontender] : the abdomen was soft and nontender [No Mass] : no masses were palpated [No HSM] : no hepatosplenomegaly noted [None] : no CVA tenderness [Normal Kyphosis] : normal kyphosis [No Visual Abnormalities] : no visible abnormalities [Normal Lordosis] : normal lordosis [No Scoliosis] : no scoliosis [No Tenderness to Palpation] : no spine tenderness on palpation [No Masses] : no masses [Full ROM] : full ROM [No Pain with ROM] : no pain with motion in any direction [Intact] : all reflexes within normal limits bilaterally [Normal Station and Gait] : the gait and station were normal [Normal Motor Tone] : the muscle tone was normal [Involuntary Movements] : no involuntary movements were seen [Normal Scalp] : inspection of the scalp showed no abnormalities [Examination Of The Hair] : texture and distribution of hair was normal [Complexion Fair] : fair complexion [Normal] : the deep tendon reflexes were normal [Normal Mental Status] : the patient's orientation, memory, attention, language and fund of knowledge were normal [Appropriate] : appropriate [S3] : no S3 [S4] : no S4 [Rt] : no varicose veins of the right leg [Lt] : no varicose veins of the left leg [Right Carotid Bruit] : no bruit heard over the right carotid [Left Carotid Bruit] : no bruit heard over the left carotid [Right Femoral Bruit] : no bruit heard over the right femoral artery [Left Femoral Bruit] : no bruit heard over the left femoral artery [Bruit] : no bruit heard [Cervical Lymph Nodes Enlarged Posterior Bilaterally] : nodes not enlarged [Supraclavicular Lymph Nodes Enlarged Bilaterally] : nodes not enlarged [Axillary Lymph Nodes Enlarged Bilaterally] : nodes not enlarged [Inguinal Lymph Nodes Enlarged Bilaterally] : nodes not enlarged [Abnormal Color] : normal color and pigmentation [Skin Lesions 1] : no skin lesions were observed [Skin Turgor Decreased] : normal skin turgor [Impaired judgment] : intact judgment [Impaired Insight] : intact insight

## 2021-10-15 ENCOUNTER — APPOINTMENT (OUTPATIENT)
Dept: INTERNAL MEDICINE | Facility: HOSPITAL | Age: 61
End: 2021-10-15

## 2021-10-15 ENCOUNTER — OUTPATIENT (OUTPATIENT)
Dept: OUTPATIENT SERVICES | Facility: HOSPITAL | Age: 61
LOS: 1 days | End: 2021-10-15
Payer: COMMERCIAL

## 2021-10-15 DIAGNOSIS — Z12.11 ENCOUNTER FOR SCREENING FOR MALIGNANT NEOPLASM OF COLON: ICD-10-CM

## 2021-10-15 PROCEDURE — 45380 COLONOSCOPY AND BIOPSY: CPT

## 2021-10-15 PROCEDURE — 45378 DIAGNOSTIC COLONOSCOPY: CPT

## 2021-10-21 ENCOUNTER — APPOINTMENT (OUTPATIENT)
Dept: RHEUMATOLOGY | Facility: CLINIC | Age: 61
End: 2021-10-21
Payer: COMMERCIAL

## 2021-10-21 VITALS
BODY MASS INDEX: 18.89 KG/M2 | RESPIRATION RATE: 16 BRPM | SYSTOLIC BLOOD PRESSURE: 136 MMHG | TEMPERATURE: 98.2 F | HEART RATE: 74 BPM | DIASTOLIC BLOOD PRESSURE: 78 MMHG | OXYGEN SATURATION: 97 % | WEIGHT: 90 LBS | HEIGHT: 58 IN

## 2021-10-21 DIAGNOSIS — M25.50 PAIN IN UNSPECIFIED JOINT: ICD-10-CM

## 2021-10-21 PROCEDURE — 99204 OFFICE O/P NEW MOD 45 MIN: CPT

## 2021-10-22 ENCOUNTER — OUTPATIENT (OUTPATIENT)
Dept: OUTPATIENT SERVICES | Facility: HOSPITAL | Age: 61
LOS: 1 days | End: 2021-10-22
Payer: COMMERCIAL

## 2021-10-22 ENCOUNTER — RESULT REVIEW (OUTPATIENT)
Age: 61
End: 2021-10-22

## 2021-10-22 LAB
25(OH)D3 SERPL-MCNC: 42.1 NG/ML
CRP SERPL-MCNC: <3 MG/L
ERYTHROCYTE [SEDIMENTATION RATE] IN BLOOD BY WESTERGREN METHOD: 18 MM/HR
RHEUMATOID FACT SER QL: <10 IU/ML
TSH SERPL-ACNC: 1.18 UIU/ML

## 2021-10-22 PROCEDURE — 73130 X-RAY EXAM OF HAND: CPT | Mod: 26,50

## 2021-10-22 PROCEDURE — 73130 X-RAY EXAM OF HAND: CPT

## 2021-10-22 PROCEDURE — 72100 X-RAY EXAM L-S SPINE 2/3 VWS: CPT | Mod: 26

## 2021-10-22 PROCEDURE — 72100 X-RAY EXAM L-S SPINE 2/3 VWS: CPT

## 2021-10-22 PROCEDURE — 72070 X-RAY EXAM THORAC SPINE 2VWS: CPT

## 2021-10-22 PROCEDURE — 72070 X-RAY EXAM THORAC SPINE 2VWS: CPT | Mod: 26

## 2021-10-23 LAB
CCP AB SER IA-ACNC: <8 UNITS
RF+CCP IGG SER-IMP: NEGATIVE

## 2021-10-23 NOTE — ASSESSMENT
[FreeTextEntry1] : Patient with OP; intermittent arthralgias:\par \par She will continue on Calcium and VitD at the recommended doses of 1200mg and 800IU daily, respectively, whether through foods or supplements.  We reviewed calcium and VitD enriched foods as well.  Food plan discussed.  Denosumab injections rec. in lieu of current bisphosphonate therapy.  Hand and spine films requested.  Core strengthening encouraged.  \par Additionally, patient to have inflammatory markers and serologies drawn to assess for an underlying inflammatory process.  \par She is in agreement with the above plan and will return in one months' time.\par \par \par

## 2021-10-23 NOTE — HISTORY OF PRESENT ILLNESS
[FreeTextEntry1] : Patient presents with history of osteoporosis having taken bisphosphonate therapy over the last two years with worsening bone density scores. Patient with bone density was recent scores of T score of -2.8 in the left hip and of -3.9 in the fem neck and -3.1 noted in the L-spine.  She denies recent falls, fractured dentition loss. She further denies joint pain with intermittent lower back discomfort upon prolonged positioning otherwise denies accompanied joint swelling or paresthesias. Blood tests notable for high ferritin; hemochromatosis gene not identified. Patient denies skin color changes,  Raynaud's,rash, or  photosensitivity. \par She further denies visual disturbances, dyspnea chest pain, motor/ sensory disturbances or systemic symptoms. [Weight Loss] : no weight loss [Malaise] : no malaise [Fever] : no fever [Chills] : no chills [Fatigue] : no fatigue [Malar Facial Rash] : no malar facial rash [Skin Lesions] : no lesions [Oral Ulcers] : no oral ulcers [Cough] : no cough [Dry Mouth] : no dry mouth [Shortness of Breath] : no shortness of breath [Chest Pain] : no chest pain [Arthralgias] : no arthralgias [Decreased ROM] : no decreased range of motion [Morning Stiffness] : no morning stiffness [Falls] : no falls [Difficulty Walking] : no difficulty walking [Myalgias] : no myalgias [Muscle Cramping] : no muscle cramping [Eye Pain] : no eye pain [Dry Eyes] : no dry eyes

## 2021-10-23 NOTE — PHYSICAL EXAM
[General Appearance - Alert] : alert [General Appearance - In No Acute Distress] : in no acute distress [Sclera] : the sclera and conjunctiva were normal [Auscultation Breath Sounds / Voice Sounds] : lungs were clear to auscultation bilaterally [Edema] : there was no peripheral edema [No Spinal Tenderness] : no spinal tenderness [Abnormal Walk] : normal gait [Musculoskeletal - Swelling] : no joint swelling seen [] : no rash [Skin Lesions] : no skin lesions [Motor Exam] : the motor exam was normal [Oriented To Time, Place, And Person] : oriented to person, place, and time [Impaired Insight] : insight and judgment were intact

## 2021-10-23 NOTE — CONSULT LETTER
[Dear  ___] : Dear  [unfilled], [Consult Letter:] : I had the pleasure of evaluating your patient, [unfilled]. [Please see my note below.] : Please see my note below. [Consult Closing:] : Thank you very much for allowing me to participate in the care of this patient.  If you have any questions, please do not hesitate to contact me. [Sincerely,] : Sincerely, [FreeTextEntry2] : Danielle Bell MD [FreeTextEntry3] : Lexy Lilly M.D., [facility]\par  of Medicine \par [facility]/[place]\par \par

## 2021-10-23 NOTE — REVIEW OF SYSTEMS
[Fever] : no fever [Chills] : no chills [Eye Pain] : no eye pain [Sore Throat] : no sore throat [Hoarseness] : no hoarseness [Chest Pain] : no chest pain [Palpitations] : no palpitations [Cough] : no cough [SOB on Exertion] : no shortness of breath during exertion [Arthralgias] : no arthralgias [Joint Stiffness] : no joint stiffness [Skin Lesions] : no skin lesions [Difficulty Walking] : no difficulty walking [Sleep Disturbances] : no sleep disturbances [Feelings Of Weakness] : no feelings of weakness [Easy Bleeding] : no tendency for easy bleeding [Easy Bruising] : no tendency for easy bruising

## 2021-10-25 LAB — ANACR T: NEGATIVE

## 2021-11-01 LAB — 14-3-3 ETA AG SER IA-MCNC: <0.2 NG/ML

## 2021-11-11 ENCOUNTER — APPOINTMENT (OUTPATIENT)
Dept: RHEUMATOLOGY | Facility: CLINIC | Age: 61
End: 2021-11-11
Payer: COMMERCIAL

## 2021-11-11 VITALS
BODY MASS INDEX: 19.1 KG/M2 | HEIGHT: 58 IN | TEMPERATURE: 96.9 F | OXYGEN SATURATION: 99 % | HEART RATE: 73 BPM | RESPIRATION RATE: 16 BRPM | WEIGHT: 91 LBS | DIASTOLIC BLOOD PRESSURE: 71 MMHG | SYSTOLIC BLOOD PRESSURE: 125 MMHG

## 2021-11-11 PROCEDURE — 96401 CHEMO ANTI-NEOPL SQ/IM: CPT

## 2021-11-11 RX ORDER — DENOSUMAB 60 MG/ML
60 INJECTION SUBCUTANEOUS
Qty: 1 | Refills: 0 | Status: COMPLETED | OUTPATIENT
Start: 2021-11-11

## 2021-11-11 RX ADMIN — DENOSUMAB 0 MG/ML: 60 INJECTION SUBCUTANEOUS at 00:00

## 2021-11-11 NOTE — PROCEDURE
[Today's Date:] : Date: [unfilled] [Patient] : the patient [Risks] : risks [Benefits] : benefits [Consent Obtained] : written consent was obtained prior to the procedure and is detailed in the patient's record [Therapeutic] : therapeutic [#1 Site: ______] : #1 site identified in the [unfilled] [Alcohol] : alcohol [Tolerated Well] : the patient tolerated the procedure well [No Complications] : there were no complications [Patient Instructed to Call] : patient was instructed to call if redness at site, a decrease in range of motion or an increase in pain is noted after procedure. [FreeTextEntry5] : Denosumab 60mg/1mL injected

## 2021-11-11 NOTE — ASSESSMENT
[FreeTextEntry1] : Patient with OP:\par Denosumab injection given.  Discussed the side effects including myalgias and hypocalcemia that may arise from injection.  Blood tests scheduled at PCP in the next two months.  She will continue on Calcium and VitD at the recommended doses of 1200mg and 800IU daily, respectively.  We reviewed calcium and VitD enriched foods as well. She is in agreement with the above plan and will return in six  months' time.\par  \par

## 2022-03-31 ENCOUNTER — APPOINTMENT (OUTPATIENT)
Dept: INTERNAL MEDICINE | Facility: CLINIC | Age: 62
End: 2022-03-31
Payer: COMMERCIAL

## 2022-03-31 VITALS
DIASTOLIC BLOOD PRESSURE: 81 MMHG | SYSTOLIC BLOOD PRESSURE: 120 MMHG | HEIGHT: 58 IN | HEART RATE: 79 BPM | RESPIRATION RATE: 15 BRPM | OXYGEN SATURATION: 97 % | WEIGHT: 81 LBS | TEMPERATURE: 97.1 F | BODY MASS INDEX: 17 KG/M2

## 2022-03-31 DIAGNOSIS — Z01.818 ENCOUNTER FOR OTHER PREPROCEDURAL EXAMINATION: ICD-10-CM

## 2022-03-31 DIAGNOSIS — N60.29 FIBROADENOSIS OF UNSPECIFIED BREAST: ICD-10-CM

## 2022-03-31 PROCEDURE — 99213 OFFICE O/P EST LOW 20 MIN: CPT | Mod: 25

## 2022-03-31 NOTE — HEALTH RISK ASSESSMENT
[Never] : Never [No] : In the past 12 months have you used drugs other than those required for medical reasons? 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

## 2022-03-31 NOTE — PHYSICAL EXAM
[No Acute Distress] : no acute distress [Well Nourished] : well nourished [Well Developed] : well developed [Well-Appearing] : well-appearing [Normal Sclera/Conjunctiva] : normal sclera/conjunctiva [PERRL] : pupils equal round and reactive to light [EOMI] : extraocular movements intact [Normal Outer Ear/Nose] : the outer ears and nose were normal in appearance [Normal Oropharynx] : the oropharynx was normal [No JVD] : no jugular venous distention [No Lymphadenopathy] : no lymphadenopathy [Supple] : supple [Thyroid Normal, No Nodules] : the thyroid was normal and there were no nodules present [No Respiratory Distress] : no respiratory distress  [No Accessory Muscle Use] : no accessory muscle use [Clear to Auscultation] : lungs were clear to auscultation bilaterally [Normal Rate] : normal rate  [Regular Rhythm] : with a regular rhythm [Normal S1, S2] : normal S1 and S2 [No Carotid Bruits] : no carotid bruits [No Abdominal Bruit] : a ~M bruit was not heard ~T in the abdomen [No Varicosities] : no varicosities [Pedal Pulses Present] : the pedal pulses are present [No Edema] : there was no peripheral edema [No Palpable Aorta] : no palpable aorta [No Extremity Clubbing/Cyanosis] : no extremity clubbing/cyanosis [Soft] : abdomen soft [Non Tender] : non-tender [Non-distended] : non-distended [No Masses] : no abdominal mass palpated [No HSM] : no HSM [Normal Bowel Sounds] : normal bowel sounds [Normal Posterior Cervical Nodes] : no posterior cervical lymphadenopathy [Normal Anterior Cervical Nodes] : no anterior cervical lymphadenopathy [No CVA Tenderness] : no CVA  tenderness [No Spinal Tenderness] : no spinal tenderness [No Joint Swelling] : no joint swelling [Grossly Normal Strength/Tone] : grossly normal strength/tone [No Rash] : no rash [Coordination Grossly Intact] : coordination grossly intact [No Focal Deficits] : no focal deficits [Normal Gait] : normal gait [Deep Tendon Reflexes (DTR)] : deep tendon reflexes were 2+ and symmetric [Normal Affect] : the affect was normal [Normal Insight/Judgement] : insight and judgment were intact

## 2022-03-31 NOTE — HISTORY OF PRESENT ILLNESS
[FreeTextEntry1] : fu  [de-identified] : Pt has lost 10 lbs  since she was caring for her  with cancer and he had surgery .    She has seen rheumatology and received prolia.   Pt is feeling well   she -denies any headaches, nausea, vomiting, fever, chills, sweats, chest pain, chest pressure, diarrhea, constipation, dysphagia, sour taste in the mouth, dizziness, leg swelling, leg pain, myalgias, arthralgias, itchy eyes, itchy ears, heartburn, or reflux.\par \par \par

## 2022-03-31 NOTE — ASSESSMENT
[FreeTextEntry1] : 1 hld  Discussed intake of plant based foods, including vegetables, fruits, and whole grain foods: legumes, nuts and seeds, fish or seafood, lean meats, and non-fat or low-fat diary foods. Plant based oils (non-tropical) in place of solid fats. Instructed patient to limit intake of high fat meats and processed meats, high-fat diary foods, dietary cholesterol and sodium, foods and beverages with added sugars. \par 2  gb polyp fu us   gallbladder polyps Surgery is necessary if polyps cause symptoms or are larger than 1 cm. Doctors also recommend surgery when a polyp has grown by 2 mm or more since the last checkup. True gallbladder polyps are rare, and they can cause gallbladder cancer. The treatment involves surgical removal of the gallbladder Very small polyps, those less than 1 centimeter (or less than 1.5 cm, according to some studies) may not need gallbladder removal surgery, and instead can be regularly monitored by scanning and re-evaluated for any suspicious changes that could indicate gallbladder cancer.\par  \par Polyps larger than 1 centimeter in size are more likely to become cancerous, especially those that are 1.5 centimeter across and larger — they have a 46 to 70 percent chance of containing cancer cells.\par  \par Monitoring for gallbladder polyps less than 1.5 centimeter should occur every three to six months for up to two years, after which it can be stopped if there have been no changes in the polyps. It isn't recommended that gallbladder polyps smaller than 0.5 centimeter across be treated by having the gallbladder removed. In gallbladder polyps that are that small, the risk of gallbladder cancer is extremely rare.\par  \par Gallbladder polyps that appear cancerous can be treated by surgical removal of the gallbladder. For larger gallbladder polyps, cholecystectomy may also be recommended to prevent the development of gallbladder cancer.\par   \par Deciding how to treat gallbladder polyps requires using thoughtful balance — weighing the potential risks of surgery against the potential risks of the development of gallbladder cancer. Paying attention to the overall cancer risk and considering careful monitoring of gallbladder polyps can be an effective treatment strategy to preserve your health\par 3 osteoporosis Make your home safer – To avoid falling at home, get rid of things that might make you trip or slip. This might include furniture, electrical cords, clutter, and loose rugs Keep your home well-lit so that you can easily see where you are going. Avoid storing things in high places so you don't have to reach or climb.\par ?Wear sturdy shoes that fit well – Wearing shoes with high heels or slippery soles, or shoes that are too loose, can lead to falls. Walking around in bare feet, or only socks, can also increase your risk of falling.\par ?Take vitamin D pills – Taking vitamin D might lower the risk of falls in older people. This is because vitamin D helps make bones and muscles stronger. Your doctor can talk to you about whether you should take extra vitamin D, and how much.\par ?Stay active – Exercising on a regular basis can help lower your risk of falling. It might also help prevent you from getting hurt if you do fall. It is best to do a few different activities that help with both strength and balance. There are many kinds of exercise that can be safe for older people. These include walking, swimming, and Kingsley Chi (a Chinese martial art that involves slow, gentle movements).\par ?Use a cane, walker, and other safety devices – If your doctor recommends that you use a cane or walker, be sure that it's the right size and you know how to use it. There are other devices that might help you avoid falling, too. These include grab bars or a sturdy seat for the shower, non-slip bath mats, and hand rails or treads for the stairs (to prevent slipping).\par If you worry that you could fall, there are also alarm buttons that let you call for help if you fall and can't get up.\par \par 4  breast  us  breast and mammogram  \par 5 elevated liver enzymes   fu levels

## 2022-04-02 LAB
25(OH)D3 SERPL-MCNC: 67.5 NG/ML
ALBUMIN SERPL ELPH-MCNC: 4.9 G/DL
ALP BLD-CCNC: 36 U/L
ALT SERPL-CCNC: 14 U/L
ANION GAP SERPL CALC-SCNC: 13 MMOL/L
AST SERPL-CCNC: 20 U/L
BASOPHILS # BLD AUTO: 0.04 K/UL
BASOPHILS NFR BLD AUTO: 0.9 %
BILIRUB SERPL-MCNC: 0.8 MG/DL
BUN SERPL-MCNC: 11 MG/DL
CALCIUM SERPL-MCNC: 9.7 MG/DL
CHLORIDE SERPL-SCNC: 104 MMOL/L
CHOLEST SERPL-MCNC: 236 MG/DL
CO2 SERPL-SCNC: 28 MMOL/L
CREAT SERPL-MCNC: 0.58 MG/DL
EGFR: 102 ML/MIN/1.73M2
EOSINOPHIL # BLD AUTO: 0.01 K/UL
EOSINOPHIL NFR BLD AUTO: 0.2 %
FERRITIN SERPL-MCNC: 418 NG/ML
FOLATE SERPL-MCNC: >20 NG/ML
GLUCOSE SERPL-MCNC: 99 MG/DL
HCT VFR BLD CALC: 40.3 %
HDLC SERPL-MCNC: 49 MG/DL
HGB BLD-MCNC: 12.7 G/DL
IMM GRANULOCYTES NFR BLD AUTO: 0.2 %
IRON SATN MFR SERPL: 39 %
IRON SERPL-MCNC: 126 UG/DL
LDLC SERPL CALC-MCNC: 159 MG/DL
LYMPHOCYTES # BLD AUTO: 1.37 K/UL
LYMPHOCYTES NFR BLD AUTO: 30.1 %
MAN DIFF?: NORMAL
MCHC RBC-ENTMCNC: 30.2 PG
MCHC RBC-ENTMCNC: 31.5 GM/DL
MCV RBC AUTO: 95.7 FL
MONOCYTES # BLD AUTO: 0.29 K/UL
MONOCYTES NFR BLD AUTO: 6.4 %
NEUTROPHILS # BLD AUTO: 2.83 K/UL
NEUTROPHILS NFR BLD AUTO: 62.2 %
NONHDLC SERPL-MCNC: 187 MG/DL
PLATELET # BLD AUTO: 300 K/UL
POTASSIUM SERPL-SCNC: 3.9 MMOL/L
PROT SERPL-MCNC: 6.9 G/DL
RBC # BLD: 4.21 M/UL
RBC # FLD: 13.6 %
SODIUM SERPL-SCNC: 145 MMOL/L
TIBC SERPL-MCNC: 321 UG/DL
TRIGL SERPL-MCNC: 135 MG/DL
TSH SERPL-ACNC: 0.95 UIU/ML
UIBC SERPL-MCNC: 194 UG/DL
VIT B12 SERPL-MCNC: 1018 PG/ML
WBC # FLD AUTO: 4.55 K/UL

## 2022-04-04 ENCOUNTER — TRANSCRIPTION ENCOUNTER (OUTPATIENT)
Age: 62
End: 2022-04-04

## 2022-04-18 ENCOUNTER — RX RENEWAL (OUTPATIENT)
Age: 62
End: 2022-04-18

## 2022-06-06 ENCOUNTER — RESULT REVIEW (OUTPATIENT)
Age: 62
End: 2022-06-06

## 2022-06-06 ENCOUNTER — APPOINTMENT (OUTPATIENT)
Dept: MAMMOGRAPHY | Facility: CLINIC | Age: 62
End: 2022-06-06
Payer: COMMERCIAL

## 2022-06-06 ENCOUNTER — APPOINTMENT (OUTPATIENT)
Dept: ULTRASOUND IMAGING | Facility: CLINIC | Age: 62
End: 2022-06-06
Payer: COMMERCIAL

## 2022-06-06 ENCOUNTER — OUTPATIENT (OUTPATIENT)
Dept: OUTPATIENT SERVICES | Facility: HOSPITAL | Age: 62
LOS: 1 days | End: 2022-06-06

## 2022-06-06 PROCEDURE — 77067 SCR MAMMO BI INCL CAD: CPT | Mod: 26

## 2022-06-06 PROCEDURE — 76641 ULTRASOUND BREAST COMPLETE: CPT | Mod: 26,50

## 2022-06-06 PROCEDURE — 77063 BREAST TOMOSYNTHESIS BI: CPT | Mod: 26

## 2022-06-06 PROCEDURE — 76700 US EXAM ABDOM COMPLETE: CPT | Mod: 26

## 2022-06-08 ENCOUNTER — APPOINTMENT (OUTPATIENT)
Dept: RHEUMATOLOGY | Facility: CLINIC | Age: 62
End: 2022-06-08
Payer: COMMERCIAL

## 2022-06-08 ENCOUNTER — TRANSCRIPTION ENCOUNTER (OUTPATIENT)
Age: 62
End: 2022-06-08

## 2022-06-08 VITALS
HEIGHT: 58 IN | BODY MASS INDEX: 18.47 KG/M2 | WEIGHT: 88 LBS | DIASTOLIC BLOOD PRESSURE: 87 MMHG | HEART RATE: 70 BPM | RESPIRATION RATE: 16 BRPM | SYSTOLIC BLOOD PRESSURE: 151 MMHG | OXYGEN SATURATION: 99 % | TEMPERATURE: 97.7 F

## 2022-06-08 PROCEDURE — 96401 CHEMO ANTI-NEOPL SQ/IM: CPT

## 2022-06-08 RX ORDER — DENOSUMAB 60 MG/ML
60 INJECTION SUBCUTANEOUS
Qty: 1 | Refills: 0 | Status: COMPLETED | OUTPATIENT
Start: 2022-06-08

## 2022-06-08 RX ADMIN — DENOSUMAB 0 MG/ML: 60 INJECTION SUBCUTANEOUS at 00:00

## 2022-06-09 LAB
25(OH)D3 SERPL-MCNC: 62.5 NG/ML
ALBUMIN SERPL ELPH-MCNC: 5 G/DL
ALP BLD-CCNC: 45 U/L
ALT SERPL-CCNC: 20 U/L
ANION GAP SERPL CALC-SCNC: 12 MMOL/L
AST SERPL-CCNC: 26 U/L
BASOPHILS # BLD AUTO: 0.07 K/UL
BASOPHILS NFR BLD AUTO: 1.5 %
BILIRUB SERPL-MCNC: 0.6 MG/DL
BUN SERPL-MCNC: 13 MG/DL
CALCIUM ?TM UR-MCNC: 28.9 MG/DL
CALCIUM SERPL-MCNC: 9.9 MG/DL
CHLORIDE SERPL-SCNC: 106 MMOL/L
CHOLEST SERPL-MCNC: 211 MG/DL
CO2 SERPL-SCNC: 26 MMOL/L
CREAT SERPL-MCNC: 0.55 MG/DL
EGFR: 104 ML/MIN/1.73M2
EOSINOPHIL # BLD AUTO: 0.05 K/UL
EOSINOPHIL NFR BLD AUTO: 1.1 %
ESTIMATED AVERAGE GLUCOSE: 111 MG/DL
GLUCOSE SERPL-MCNC: 100 MG/DL
HBA1C MFR BLD HPLC: 5.5 %
HCT VFR BLD CALC: 40.4 %
HDLC SERPL-MCNC: 55 MG/DL
HGB BLD-MCNC: 12.7 G/DL
IMM GRANULOCYTES NFR BLD AUTO: 0.2 %
LDLC SERPL CALC-MCNC: 133 MG/DL
LYMPHOCYTES # BLD AUTO: 2.02 K/UL
LYMPHOCYTES NFR BLD AUTO: 44.3 %
MAN DIFF?: NORMAL
MCHC RBC-ENTMCNC: 29.1 PG
MCHC RBC-ENTMCNC: 31.4 GM/DL
MCV RBC AUTO: 92.7 FL
MONOCYTES # BLD AUTO: 0.45 K/UL
MONOCYTES NFR BLD AUTO: 9.9 %
NEUTROPHILS # BLD AUTO: 1.96 K/UL
NEUTROPHILS NFR BLD AUTO: 43 %
NONHDLC SERPL-MCNC: 156 MG/DL
PLATELET # BLD AUTO: 303 K/UL
POTASSIUM SERPL-SCNC: 4.9 MMOL/L
PROT SERPL-MCNC: 7 G/DL
RBC # BLD: 4.36 M/UL
RBC # FLD: 13 %
SODIUM SERPL-SCNC: 145 MMOL/L
TRIGL SERPL-MCNC: 118 MG/DL
WBC # FLD AUTO: 4.56 K/UL

## 2022-11-23 ENCOUNTER — RX RENEWAL (OUTPATIENT)
Age: 62
End: 2022-11-23

## 2022-12-15 ENCOUNTER — APPOINTMENT (OUTPATIENT)
Dept: RHEUMATOLOGY | Facility: CLINIC | Age: 62
End: 2022-12-15

## 2022-12-15 VITALS
SYSTOLIC BLOOD PRESSURE: 151 MMHG | HEART RATE: 64 BPM | DIASTOLIC BLOOD PRESSURE: 88 MMHG | WEIGHT: 90 LBS | BODY MASS INDEX: 18.89 KG/M2 | OXYGEN SATURATION: 98 % | TEMPERATURE: 97.3 F | HEIGHT: 58 IN | RESPIRATION RATE: 16 BRPM

## 2022-12-15 PROCEDURE — 96401 CHEMO ANTI-NEOPL SQ/IM: CPT

## 2022-12-15 RX ORDER — DENOSUMAB 60 MG/ML
60 INJECTION SUBCUTANEOUS
Qty: 1 | Refills: 0 | Status: COMPLETED | OUTPATIENT
Start: 2022-12-15

## 2022-12-15 RX ADMIN — DENOSUMAB 0 MG/ML: 60 INJECTION SUBCUTANEOUS at 00:00

## 2023-02-13 ENCOUNTER — LABORATORY RESULT (OUTPATIENT)
Age: 63
End: 2023-02-13

## 2023-02-13 ENCOUNTER — APPOINTMENT (OUTPATIENT)
Dept: INTERNAL MEDICINE | Facility: CLINIC | Age: 63
End: 2023-02-13
Payer: COMMERCIAL

## 2023-02-13 VITALS
HEIGHT: 58 IN | SYSTOLIC BLOOD PRESSURE: 125 MMHG | TEMPERATURE: 97.3 F | WEIGHT: 88 LBS | DIASTOLIC BLOOD PRESSURE: 74 MMHG | BODY MASS INDEX: 18.47 KG/M2 | OXYGEN SATURATION: 97 % | HEART RATE: 85 BPM

## 2023-02-13 DIAGNOSIS — D12.6 BENIGN NEOPLASM OF COLON, UNSPECIFIED: ICD-10-CM

## 2023-02-13 PROCEDURE — 99396 PREV VISIT EST AGE 40-64: CPT

## 2023-02-13 RX ORDER — ATORVASTATIN CALCIUM 10 MG/1
10 TABLET, FILM COATED ORAL
Qty: 1 | Refills: 3 | Status: COMPLETED | COMMUNITY
Start: 2021-06-17 | End: 2023-02-13

## 2023-02-13 RX ORDER — ALENDRONATE SODIUM 70 MG/1
70 TABLET ORAL
Qty: 12 | Refills: 3 | Status: COMPLETED | COMMUNITY
Start: 2019-11-21 | End: 2023-02-13

## 2023-02-13 RX ORDER — POLYETHYLENE GLYCOL 3350 17 G/17G
17 POWDER, FOR SOLUTION ORAL
Qty: 1 | Refills: 0 | Status: COMPLETED | COMMUNITY
Start: 2021-10-05 | End: 2023-02-13

## 2023-02-13 NOTE — PHYSICAL EXAM
[Well Developed] : well developed [Well Nourished] : well nourished [Normal Voice Quality] : was normal [Normal Verbal Skills] : the patient had normal verbal communication skills [Conjunctiva] : the conjunctiva were normal in both eyes [PERRL] : pupils were equal in size, round, and reactive to light [EOM Intact] : extraocular movements were intact [Normal Appearance] : was normal in appearance [Neck Supple] : was supple [Rate ___] : at [unfilled] breaths per minute [Normal Rhythm/Effort] : normal respiratory rhythm and effort [Clear Bilaterally] : the lungs were clear to auscultation bilaterally [Normal to Percussion] : the lungs were normal to percussion [5th Left ICS - MCL] : palpated at the 5th LICS in the midclavicular line [Heart Rate ___] : [unfilled] bpm [Rhythm Regular] : regular [Normal Rate] : normal [Normal S1] : normal S1 [Normal S2] : normal S2 [No Murmur] : no murmurs heard [No Pitting Edema] : no pitting edema present [2+] : left 2+ [No Abnormalities] : the abdominal aorta was not enlarged and no bruit was heard [Examination Of The Breasts] : a normal appearance [No Discharge] : no discharge [Flat] : flat [Soft, Nontender] : the abdomen was soft and nontender [No Mass] : no masses were palpated [No HSM] : no hepatosplenomegaly noted [None] : no hernias were palpable [No Lymphangitis] : no lymphangitis observed [Normal Kyphosis] : normal kyphosis [No Visual Abnormalities] : no visible abnormalities [Normal Lordosis] : normal lordosis [No Scoliosis] : no scoliosis [No Tenderness to Palpation] : no spine tenderness on palpation [No Masses] : no masses [Full ROM] : full ROM [No Pain with ROM] : no pain with motion in any direction [Intact] : all reflexes within normal limits bilaterally [Normal Station and Gait] : the gait and station were normal [Normal Motor Tone] : the muscle tone was normal [Involuntary Movements] : no involuntary movements were seen [Normal Scalp] : inspection of the scalp showed no abnormalities [Examination Of The Hair] : texture and distribution of hair was normal [Complexion Medium] : medium complexion [Normal] : the deep tendon reflexes were normal [Normal Mental Status] : the patient's orientation, memory, attention, language and fund of knowledge were normal [Appropriate] : appropriate [Enlarged Diffusely] : was not enlarged [S3] : no S3 [S4] : no S4 [Rt] : no varicose veins of the right leg [Lt] : no varicose veins of the left leg [Right Carotid Bruit] : no bruit heard over the right carotid [Left Carotid Bruit] : no bruit heard over the left carotid [Right Femoral Bruit] : no bruit heard over the right femoral artery [Left Femoral Bruit] : no bruit heard over the left femoral artery [Bruit] : no bruit heard [Postauricular Lymph Nodes Enlarged Bilaterally] : nodes not enlarged [Preauricular Lymph Nodes Enlarged Bilaterally] : nodes not enlarged [Submandibular Lymph Nodes Enlarged Bilaterally] : nodes not enlarged [Suboccipital Lymph Nodes Enlarged Bilaterally] : nodes not enlarged [Submental Lymph Nodes Enlarged] : nodes not enlarged [Cervical Lymph Nodes Enlarged Posterior Bilaterally] : nodes not enlarged [Cervical Lymph Nodes Enlarged Anterior Bilaterally] : nodes not enlarged [Supraclavicular Lymph Nodes Enlarged Bilaterally] : nodes not enlarged [Axillary Lymph Nodes Enlarged Bilaterally] : nodes not enlarged [Epitrochlear Lymph Nodes Enlarged Bilaterally] : nodes not enlarged [Femoral Lymph Nodes Enlarged Bilaterally] : nodes not enlarged [Inguinal Lymph Nodes Enlarged Bilaterally] : nodes not enlarged [Abnormal Color] : normal color and pigmentation [Skin Lesions 1] : no skin lesions were observed [Skin Turgor Decreased] : normal skin turgor [Impaired judgment] : intact judgment [Impaired Insight] : intact insight [de-identified] : tongue  normal

## 2023-02-13 NOTE — HEALTH RISK ASSESSMENT
[Excellent] : ~his/her~ current health as excellent [Good] : ~his/her~  mood as  good [No] : In the past 12 months have you used drugs other than those required for medical reasons? No [No falls in past year] : Patient reported no falls in the past year [0] : 2) Feeling down, depressed, or hopeless: Not at all (0) [PHQ-2 Negative - No further assessment needed] : PHQ-2 Negative - No further assessment needed [Patient reported mammogram was normal] : Patient reported mammogram was normal [Patient reported PAP Smear was normal] : Patient reported PAP Smear was normal [Patient reported bone density results were abnormal] : Patient reported bone density results were abnormal [HIV test declined] : HIV test declined [Hepatitis C test offered] : Hepatitis C test offered [None] : None [With Family] : lives with family [# of Members in Household ___] :  household currently consist of [unfilled] member(s) [Employed] : employed [Graduate School] : graduate school [] :  [# Of Children ___] : has [unfilled] children [Sexually Active] : sexually active [Feels Safe at Home] : Feels safe at home [Fully functional (bathing, dressing, toileting, transferring, walking, feeding)] : Fully functional (bathing, dressing, toileting, transferring, walking, feeding) [Fully functional (using the telephone, shopping, preparing meals, housekeeping, doing laundry, using] : Fully functional and needs no help or supervision to perform IADLs (using the telephone, shopping, preparing meals, housekeeping, doing laundry, using transportation, managing medications and managing finances) [Smoke Detector] : smoke detector [Carbon Monoxide Detector] : carbon monoxide detector [Safety elements used in home] : safety elements used in home [Seat Belt] :  uses seat belt [TB Exposure] : is being exposed to tuberculosis [Never] : Never [FreeTextEntry1] : stressed about husbands  health [de-identified] : walks   [de-identified] : reg [[id number]] : 0 [Change in mental status noted] : No change in mental status noted [Language] : denies difficulty with language [Behavior] : denies difficulty with behavior [Learning/Retaining New Information] : denies difficulty learning/retaining new information [Handling Complex Tasks] : denies difficulty handling complex tasks [Reasoning] : denies difficulty with reasoning [Spatial Ability and Orientation] : denies difficulty with spatial ability and orientation [Reports changes in hearing] : Reports no changes in hearing [Reports changes in vision] : Reports no changes in vision [Reports changes in dental health] : Reports no changes in dental health [Guns at Home] : no guns at home [Sunscreen] : does not use sunscreen [Caregiver Concerns] : does not have caregiver concerns [MammogramDate] : 6/22  [PapSmearDate] : 2022 [BoneDensityDate] : 6/21   [BoneDensityComments] : osteoporosis  [ColonoscopyDate] : 10/21 [ColonoscopyComments] : tubular  adenoma   [FreeTextEntry2] : nurse  [de-identified] : last eye exam    12/22 [de-identified] : last dental  1 week  [AdvancecareDate] : 2/13/22

## 2023-02-13 NOTE — ASSESSMENT
[FreeTextEntry1] : health    she is up[ to date with  colonoscopy,  mammogram   eye and dnetal exams   and pap test \par 2  bmi  18  healthy eating   \par 3  gb polyps she has recent change of  gb polyp size from  6mm to  7 mm and now thickening of  gb wall  which is new  and has  pain in right  upper quadrant at times.  \par 4  hld  she has stopped her   latorvastatin    to lower  LDL and non HDL  cholesterol levels     1 limit your intake of foods full of saturated fats  , trans fats, and dietary cholesterol .  Food with a lot of saturated fate include butter, fatty flesh like red meat, full fat and low fat dairy  products  , palm oil and coconut oil .   If you see partially hydrogenated fat in the ingredient  list of food label that food has trans fats.  Top sources of dietary chol include egg yolks , organ meats and shell fish.  one Type of fat omega 3 Fatty acids has been shown to protect against heart disease  . Good sources are cold water fish like salmon, mackerel , halibut ., trout  herring and sardines.     Limit  your intake of meat , poultry and fish to no more than 3.5  ounces per day     Eat a lot more fiber rich foods  like beans , oats, barley fruits and vegetables   .  Food naturally rich in soluble fiber  are good at lowering cholesterol .    Excellent choices  are  oats , oat bran , barley , peas , yams sweet potatoes and legumes  or beans .  Good fruit sources are berries passion fruit, oranges pears,, apricots , apples  and nectarines  .    choose protein rich plant foods   such as legumes or beans nuts and seeds over meat.     Lose as much weight as possible and exercise   Take plant sterol supplements   .A Daily intake  of 1-2 gms  of plant sterols  lowers ldl .    Best choice is supplements  such as Cholestoff  by natures made   because they don’t have calories  sugar trans fats   an salt  of many foods enriched with plant sterols.    Take  Metamucil or psyllium   .   Studies have shown 9-10 gms as daily of psyllium   the equivalent of  about  3 teaspoons  of sugar free Metamucil   reduced LDL levels  .\par 5.    osteoporosis she is on prolia  will need repat in June  Make your home safer – To avoid falling at home, get rid of things that might make you trip or slip. This might include furniture, electrical cords, clutter, and loose rugs Keep your home well-lit so that you can easily see where you are going. Avoid storing things in high places so you don't have to reach or climb.\par ?Wear sturdy shoes that fit well – Wearing shoes with high heels or slippery soles, or shoes that are too loose, can lead to falls. Walking around in bare feet, or only socks, can also increase your risk of falling.\par ?Take vitamin D pills – Taking vitamin D might lower the risk of falls in older people. This is because vitamin D helps make bones and muscles stronger. Your doctor can talk to you about whether you should take extra vitamin D, and how much.\par ?Stay active – Exercising on a regular basis can help lower your risk of falling. It might also help prevent you from getting hurt if you do fall. It is best to do a few different activities that help with both strength and balance. There are many kinds of exercise that can be safe for older people. These include walking, swimming, and Kingsley Chi (a Chinese martial art that involves slow, gentle movements).\par ?Use a cane, walker, and other safety devices – If your doctor recommends that you use a cane or walker, be sure that it's the right size and you know how to use it. There are other devices that might help you avoid falling, too. These include grab bars or a sturdy seat for the shower, non-slip bath mats, and hand rails or treads for the stairs (to prevent slipping).\par If you worry that you could fall, there are also alarm buttons that let you call for help if you fall and can't get up.\par \par

## 2023-02-13 NOTE — HISTORY OF PRESENT ILLNESS
[FreeTextEntry1] : cpe  [de-identified] : pt is a  62 yr old woman   with gallbladder polyps and thickening of the gb wall.     she has  increased size of her polyp  and now thickening of wall within  6 months.  I suggested she has her gb removed   due to her changes.

## 2023-02-14 LAB
25(OH)D3 SERPL-MCNC: 61.5 NG/ML
ALBUMIN SERPL ELPH-MCNC: 4.7 G/DL
ALP BLD-CCNC: 35 U/L
ALT SERPL-CCNC: 24 U/L
ANION GAP SERPL CALC-SCNC: 16 MMOL/L
APPEARANCE: CLEAR
AST SERPL-CCNC: 32 U/L
BASOPHILS # BLD AUTO: 0.05 K/UL
BASOPHILS NFR BLD AUTO: 1.1 %
BILIRUB SERPL-MCNC: 0.4 MG/DL
BILIRUBIN URINE: NEGATIVE
BLOOD URINE: NEGATIVE
BUN SERPL-MCNC: 18 MG/DL
CALCIUM SERPL-MCNC: 9.1 MG/DL
CHLORIDE SERPL-SCNC: 102 MMOL/L
CHOLEST SERPL-MCNC: 249 MG/DL
CO2 SERPL-SCNC: 24 MMOL/L
COLOR: YELLOW
CREAT SERPL-MCNC: 0.48 MG/DL
EGFR: 106 ML/MIN/1.73M2
EOSINOPHIL # BLD AUTO: 0.03 K/UL
EOSINOPHIL NFR BLD AUTO: 0.6 %
FERRITIN SERPL-MCNC: 415 NG/ML
GLUCOSE QUALITATIVE U: NEGATIVE
GLUCOSE SERPL-MCNC: 83 MG/DL
HCT VFR BLD CALC: 36.9 %
HDLC SERPL-MCNC: 49 MG/DL
HGB BLD-MCNC: 12.7 G/DL
IMM GRANULOCYTES NFR BLD AUTO: 0.2 %
KETONES URINE: ABNORMAL
LDLC SERPL CALC-MCNC: 179 MG/DL
LEUKOCYTE ESTERASE URINE: NEGATIVE
LYMPHOCYTES # BLD AUTO: 1.56 K/UL
LYMPHOCYTES NFR BLD AUTO: 33.4 %
MAN DIFF?: NORMAL
MCHC RBC-ENTMCNC: 31 PG
MCHC RBC-ENTMCNC: 34.4 GM/DL
MCV RBC AUTO: 90 FL
MONOCYTES # BLD AUTO: 0.38 K/UL
MONOCYTES NFR BLD AUTO: 8.1 %
NEUTROPHILS # BLD AUTO: 2.64 K/UL
NEUTROPHILS NFR BLD AUTO: 56.6 %
NITRITE URINE: NEGATIVE
NONHDLC SERPL-MCNC: 200 MG/DL
PH URINE: 6
PLATELET # BLD AUTO: 302 K/UL
POTASSIUM SERPL-SCNC: 4.1 MMOL/L
PROT SERPL-MCNC: 6.7 G/DL
PROTEIN URINE: NEGATIVE
RBC # BLD: 4.1 M/UL
RBC # FLD: 13 %
SODIUM SERPL-SCNC: 142 MMOL/L
SPECIFIC GRAVITY URINE: 1.02
TRIGL SERPL-MCNC: 106 MG/DL
UROBILINOGEN URINE: NORMAL
WBC # FLD AUTO: 4.67 K/UL

## 2023-02-16 ENCOUNTER — APPOINTMENT (OUTPATIENT)
Dept: SURGERY | Facility: CLINIC | Age: 63
End: 2023-02-16

## 2023-02-21 ENCOUNTER — APPOINTMENT (OUTPATIENT)
Dept: SURGERY | Facility: CLINIC | Age: 63
End: 2023-02-21
Payer: COMMERCIAL

## 2023-02-21 VITALS
SYSTOLIC BLOOD PRESSURE: 149 MMHG | DIASTOLIC BLOOD PRESSURE: 92 MMHG | WEIGHT: 88 LBS | HEIGHT: 58 IN | HEART RATE: 69 BPM | BODY MASS INDEX: 18.47 KG/M2

## 2023-02-21 DIAGNOSIS — Z78.9 OTHER SPECIFIED HEALTH STATUS: ICD-10-CM

## 2023-02-21 PROCEDURE — 99204 OFFICE O/P NEW MOD 45 MIN: CPT

## 2023-02-21 RX ORDER — MULTIVITAMIN
TABLET ORAL
Refills: 0 | Status: ACTIVE | COMMUNITY

## 2023-02-22 NOTE — HISTORY OF PRESENT ILLNESS
[de-identified] : KAM MANCERA is a 62 year old female who present in the office for initial consultation who was referred by Dr. Bell with the chief complaint of having multiple gallbladder polyps. Patient has had the condition since 2018. Patient has multiple US in the pass. Last Abdominal Ultrasound on 06/06/2022 result were c/w Re demonstrated multiple gallbladder polyps, largest measuring 0.6 x 0.4 x 0.3 cm. Continued interval annual surveillance may be of value. Nonobstructing 0.3 cm right kidney stone which may be a new finding. Ms. MANCERA stated that she had a abdominal ultrasound in [facility] and it showed focal gallbladder thickening. Patient reports some abdominal discomfort at times. Patient reports no nausea or vomiting and no history of jaundice, acholia or choluria. Appetite is good and weight is stable. We recommend Robotic assisted Laparoscopic cholecystectomy, intraoperative cholangiogram, possible open.  Patient is very hesitant to pursue any surgical intervention at present time she wishes to wait and have a surgery done in June/ July 2023.

## 2023-02-22 NOTE — CONSULT LETTER
[Dear  ___] : Dear  [unfilled], [Consult Letter:] : I had the pleasure of evaluating your patient, [unfilled]. [Consult Closing:] : Thank you very much for allowing me to participate in the care of this patient.  If you have any questions, please do not hesitate to contact me. [Sincerely,] : Sincerely, [FreeTextEntry3] : Dr Mccullough

## 2023-02-22 NOTE — PLAN
[FreeTextEntry1] : Please follow up at the office post MRI and as needed for any questions or concerns

## 2023-02-22 NOTE — PHYSICAL EXAM
[No Rash or Lesion] : No rash or lesion [Alert] : alert [Oriented to Person] : oriented to person [Oriented to Place] : oriented to place [Oriented to Time] : oriented to time [Calm] : calm [de-identified] : The patient is alert, well-groomed  [de-identified] : Breath sounds equal and bilateral, no wheezing no rales or rhonchi  [de-identified] :  good S1, S2, no murmurs, rubs, gallops bilateral  [de-identified] : Normoactive bowel sounds, soft and nontender, no hepatosplenomegaly or masses noted.  [de-identified] : full range of motion and no deformities appreciated.

## 2023-02-22 NOTE — ASSESSMENT
[FreeTextEntry1] : 62 year F with multiple gallbladder polyps and focal gallbladder thickening. \par \par We recommend Robotic assisted Laparoscopic cholecystectomy, intraoperative cholangiogram, possible open.  Patient is very hesitant to pursue any surgical intervention at present time she wishes to wait and have a surgery done in June/ July 2023. \par \par Results of her recent imaging and physical examination findings were discussed in detail. she was informed of significance of findings. All of the options, risks and benefits were explained.\par \par - MRI of the liver with contrast to r/o gallbladder mass, abdominal us showed focal gallbladder thickening\par

## 2023-02-23 ENCOUNTER — RESULT REVIEW (OUTPATIENT)
Age: 63
End: 2023-02-23

## 2023-02-23 ENCOUNTER — OUTPATIENT (OUTPATIENT)
Dept: OUTPATIENT SERVICES | Facility: HOSPITAL | Age: 63
LOS: 1 days | End: 2023-02-23
Payer: COMMERCIAL

## 2023-02-23 PROCEDURE — 74183 MRI ABD W/O CNTR FLWD CNTR: CPT

## 2023-02-23 PROCEDURE — 74183 MRI ABD W/O CNTR FLWD CNTR: CPT | Mod: 26

## 2023-02-23 PROCEDURE — A9585: CPT

## 2023-06-01 ENCOUNTER — RX RENEWAL (OUTPATIENT)
Age: 63
End: 2023-06-01

## 2023-06-05 DIAGNOSIS — Z00.00 ENCOUNTER FOR GENERAL ADULT MEDICAL EXAMINATION W/OUT ABNORMAL FINDINGS: ICD-10-CM

## 2023-06-16 ENCOUNTER — APPOINTMENT (OUTPATIENT)
Dept: MAMMOGRAPHY | Facility: CLINIC | Age: 63
End: 2023-06-16
Payer: COMMERCIAL

## 2023-06-16 ENCOUNTER — RESULT REVIEW (OUTPATIENT)
Age: 63
End: 2023-06-16

## 2023-06-16 ENCOUNTER — OUTPATIENT (OUTPATIENT)
Dept: OUTPATIENT SERVICES | Facility: HOSPITAL | Age: 63
LOS: 1 days | End: 2023-06-16

## 2023-06-16 ENCOUNTER — APPOINTMENT (OUTPATIENT)
Dept: RADIOLOGY | Facility: CLINIC | Age: 63
End: 2023-06-16
Payer: COMMERCIAL

## 2023-06-16 PROCEDURE — 77067 SCR MAMMO BI INCL CAD: CPT | Mod: 26

## 2023-06-16 PROCEDURE — 77063 BREAST TOMOSYNTHESIS BI: CPT | Mod: 26

## 2023-06-16 PROCEDURE — 77085 DXA BONE DENSITY AXL VRT FX: CPT | Mod: 26

## 2023-06-22 ENCOUNTER — APPOINTMENT (OUTPATIENT)
Dept: RHEUMATOLOGY | Facility: CLINIC | Age: 63
End: 2023-06-22
Payer: COMMERCIAL

## 2023-06-22 VITALS
OXYGEN SATURATION: 98 % | RESPIRATION RATE: 16 BRPM | DIASTOLIC BLOOD PRESSURE: 91 MMHG | HEART RATE: 64 BPM | SYSTOLIC BLOOD PRESSURE: 153 MMHG | TEMPERATURE: 98.1 F | HEIGHT: 58 IN | BODY MASS INDEX: 17.84 KG/M2 | WEIGHT: 85 LBS

## 2023-06-22 PROCEDURE — 96401 CHEMO ANTI-NEOPL SQ/IM: CPT

## 2023-06-22 RX ORDER — DENOSUMAB 60 MG/ML
60 INJECTION SUBCUTANEOUS
Qty: 1 | Refills: 0 | Status: COMPLETED | OUTPATIENT
Start: 2023-06-22

## 2023-06-22 RX ADMIN — DENOSUMAB 0 MG/ML: 60 INJECTION SUBCUTANEOUS at 00:00

## 2023-07-13 ENCOUNTER — LABORATORY RESULT (OUTPATIENT)
Age: 63
End: 2023-07-13

## 2023-07-13 ENCOUNTER — APPOINTMENT (OUTPATIENT)
Dept: INTERNAL MEDICINE | Facility: CLINIC | Age: 63
End: 2023-07-13
Payer: COMMERCIAL

## 2023-07-13 VITALS
HEIGHT: 58 IN | BODY MASS INDEX: 18.26 KG/M2 | HEART RATE: 70 BPM | SYSTOLIC BLOOD PRESSURE: 119 MMHG | OXYGEN SATURATION: 97 % | WEIGHT: 87 LBS | DIASTOLIC BLOOD PRESSURE: 76 MMHG

## 2023-07-13 DIAGNOSIS — K82.8 OTHER SPECIFIED DISEASES OF GALLBLADDER: ICD-10-CM

## 2023-07-13 PROCEDURE — 99214 OFFICE O/P EST MOD 30 MIN: CPT | Mod: 25

## 2023-07-13 NOTE — ASSESSMENT
[FreeTextEntry1] : 1    gb  polyps fu    we discussed risks and  that  us   showed many polyps and change in  wall thickening  could be  the adenomyomatous   gallbladder polyps Surgery is necessary if polyps cause symptoms or are larger than 1 cm. Doctors also recommend surgery when a polyp has grown by 2 mm or more since the last checkup. True gallbladder polyps are rare, and they can cause gallbladder cancer. The treatment involves surgical removal of the gallbladder Very small polyps, those less than 1 centimeter (or less than 1.5 cm, according to some studies) may not need gallbladder removal surgery, and instead can be regularly monitored by scanning and re-evaluated for any suspicious changes that could indicate gallbladder cancer.\par  \par Polyps larger than 1 centimeter in size are more likely to become cancerous, especially those that are 1.5 centimeter across and larger — they have a 46 to 70 percent chance of containing cancer cells.\par  \par Monitoring for gallbladder polyps less than 1.5 centimeter should occur every three to six months for up to two years, after which it can be stopped if there have been no changes in the polyps. It isn't recommended that gallbladder polyps smaller than 0.5 centimeter across be treated by having the gallbladder removed. In gallbladder polyps that are that small, the risk of gallbladder cancer is extremely rare.\par  \par Gallbladder polyps that appear cancerous can be treated by surgical removal of the gallbladder. For larger gallbladder polyps, cholecystectomy may also be recommended to prevent the development of gallbladder cancer.\par   \par Deciding how to treat gallbladder polyps requires using thoughtful balance — weighing the potential risks of surgery against the potential risks of the development of gallbladder cancer. Paying attention to the overall cancer risk and considering careful monitoring of gallbladder polyps can be an effective treatment strategy to preserve your health\par 2  gb adenomymatosis   Adenomyomatosis is an abnormality of the gallbladder characterized by overgrowth of the mucosa, thickening of the muscle wall, and intramural diverticula. Despite the name, this condition does not involve any adenomatous changes in the gallbladder epithelium. While generally not considered to be a premalignant condition, adenomyomatosis is sometimes encountered in gallbladders resected for cancer, which has led to the suggestion that it is a premalignant condition [11]. However, the association and its magnitude remain unclear (see 'Adenomyomatosis and gallbladder cancer' below). There is, however, a clear association of adenomyomatosis with cholelithiasis, particularly with the segmental type, which was reported in 89 percent of patients with segmental adenomyomatosis in one series [12].\par \par Epidemiology of adenomyomatosis — Adenomyomatosis of the gallbladder is less common than cholesterolosis in most reports [13], with some exceptions [14]. In one report, for example, only 103 cases of adenomyomatosis were found in over 10,000 cholecystectomies (1 percent) [13]. This is compared with the much higher prevalence of cholesterolosis (9 to 26 percent) [9]. Adenomyomatosis is more common in women.\par \par Pathology of adenomyomatosis — The abnormality can be diffuse, segmental (annular), or localized to the fundus of the gallbladder.\par \par ?Diffuse adenomyomatosis causes thickening and irregularity of the mucosal surface and the muscle coat leading to cystic-like structures in the gallbladder wall or polypoid projections from the mucosa of the gallbladder. In the early phases, the intramural extension of the epithelium creates tubules and crypts in the lamina propria that accumulate mucous. Fluid filled mucosal pockets eventually herniate into the wall of the gallbladder and through the muscularis propria, forming cystic structures that are visible on gross inspection as pools of bile in the gallbladder wall (Rokitansky-Aschoff sinuses). The point of herniation may appear sealed due to hypertrophy of the muscularis.\par \par \par ?In the segmental type, a circumferential ring divides the gallbladder into separate interconnected compartments.\par \par \par ?In the localized type, the cystic structure forms a nodule, usually in the fundus, that projects into the lumen giving the appearance of a polyp on ultrasonography [11,14-16]. The muscle layer in the involved area is usually thickened to three to five times its usual thickness [11,15].\par \par \par Adenomyomatosis and gallbladder cancer — While generally not considered to be a premalignant condition, there have been several reports of gallbladder cancer arising in an area of segmental or fundal adenomyomatosis, but this association has not been a consistent finding in published series:\par \par In a series of 3197 consecutive, unselected cholecystectomies, gallbladder cancer was found in 6.4 percent of patients with segmental adenomyomatosis compared with 3.1 percent of all other cholecystectomies [17]. \par \par ?In a similar retrospective series of 4560 patients, gallbladder cancer was identified in 6.6 percent of patients with segmental adenomyomatosis versus 4.3 percent in those without (p = 0.049) [18]. \par \par \par ?On the other hand, in a series of 4704 cholecystectomies, adenomyomatosis was found in 2.4 percent, but no cancer was identified in any of these patients' resected gallbladders [19]. \par \par \par ?In a series of 30 patients with adenomyomatosis of the gallbladder identified over seven years and comprising 3.3 percent of all cholecystectomies in that center, there were no cases of gallbladder cancer in any of these patients, but there was an association with gallstones [20]. \par \par \par ?In a series of 1099 patients who underwent cholecystectomy, adenomyomatosis was found in 14.2 percent with a strong association with gallstones but with no cases of cancer diagnosed [12]. \par \par \par ?One series suggested that gallbladder cancer in patients with adenomyomatosis may be associated with more advanced cancer; in a series of 97 patients with gallbladder cancer, 25 percent were positive for adenomyomatosis, and in these patients there was an increased risk for a more advanced T stage, lymph node, and distant metastasis. The authors suggested that the presence of adenomyomatosis may prevent early detection of gallbladder cancer [21].\par \par \par At this time, there is no conclusive evidence that the presence of adenomyomatosis increases the risk of gallbladder cancer. If the risk is increased, the magnitude of the increased risk appears to be small (probably not more than twice the average risk). Finally, there is evidence that the presence of adenomyomatosis is associated with more advanced gallbladder cancer, possibly because its presence prevents early diagnosis of cancer on imaging studies.\par Adenomyomatosis is an abnormality of the gallbladder characterized by overgrowth of the mucosa, thickening of the muscle wall, and intramural diverticula. Despite the name, this condition does not involve any adenomatous changes in the gallbladder epithelium. While generally not considered to be a premalignant condition, adenomyomatosis is sometimes encountered in gallbladders resected for cancer, which has led to the suggestion that it is a premalignant condition . However, the association and its magnitude remain unclear (see 'Adenomyomatosis and gallbladder cancer' below. There is, however, a clear association of adenomyomatosis with cholelithiasis, particularly with the segmental type, which was reported in 89 percent of patients with segmental adenomyomatosis in one series \par \par Epidemiology of adenomyomatosis — Adenomyomatosis of the gallbladder is less common than cholesterolosis in most reports , with some exceptions . In one report, for example, only 103 cases of adenomyomatosis were found in over 10,000 cholecystectomies (1 percent)  This is compared with the much higher prevalence of cholesterolosis (9 to 26 percent) . Adenomyomatosis is more common in women.\par \par Pathology of adenomyomatosis — The abnormality can be diffuse, segmental (annular), or localized to the fundus of the gallbladder.\par \par Diffuse adenomyomatosis causes thickening and irregularity of the mucosal surface and the muscle coat leading to cystic-like structures in the gallbladder wall or polypoid projections from the mucosa of the gallbladder. In the early phases, the intramural extension of the epithelium creates tubules and crypts in the lamina propria that accumulate mucous. Fluid filled mucosal pockets eventually herniate into the wall of the gallbladder and through the muscularis propria, forming cystic structures that are visible on gross inspection as pools of bile in the gallbladder wall (Rokitansky-Aschoff sinuses). The point of herniation may appear sealed due to hypertrophy of the muscularis.\par \par \par ?In the segmental type, a circumferential ring divides the gallbladder into separate interconnected compartments.\par \par \par ?In the localized type, the cystic structure forms a nodule, usually in the fundus, that projects into the lumen giving the appearance of a polyp on ultrasonography . The muscle layer in the involved area is usually thickened to three to five times its usual thickness \par \par \par Adenomyomatosis and gallbladder cancer — While generally not considered to be a premalignant condition, there have been several reports of gallbladder cancer arising in an area of segmental or fundal adenomyomatosis, but this association has not been a consistent finding in published series:\par \par In a series of 3197 consecutive, unselected cholecystectomies, gallbladder cancer was found in 6.4 percent of patients with segmental adenomyomatosis compared with 3.1 percent of all other cholecystectomies\par \par in a similar retrospective series of 4560 patients, gallbladder cancer was identified in 6.6 percent of patients with segmental adenomyomatosis versus 4.3 percent in those without (p = 0.049) [ \par \par \par ?On the other hand, in a series of 4704 cholecystectomies, adenomyomatosis was found in 2.4 percent, but no cancer was identified in any of these patients' resected gallbladders \par \par \par ?In a series of 30 patients with adenomyomatosis of the gallbladder identified over seven years and comprising 3.3 percent of all cholecystectomies in that center, there were no cases of gallbladder cancer in any of these patients, but there was an association with gallstones  \par \par \par ?In a series of 1099 patients who underwent cholecystectomy, adenomyomatosis was found in 14.2 percent with a strong association with gallstones but with no cases of cancer diagnosed  \par \par \par ?One series suggested that gallbladder cancer in patients with adenomyomatosis may be associated with more advanced cancer; in a series of 97 patients with gallbladder cancer, 25 percent were positive for adenomyomatosis, and in these patients there was an increased risk for a more advanced T stage, lymph node, and distant metastasis. The authors suggested that the presence of adenomyomatosis may prevent early detection of gallbladder cancer \par \par \par At this time, there is no conclusive evidence that the presence of adenomyomatosis increases the risk of gallbladder cancer. If the risk is increased, the magnitude of the increased risk appears to be small (probably not more than twice the average risk). Finally, there is evidence that the presence of adenomyomatosis is associated with more advanced gallbladder cancer, possibly because its presence prevents early diagnosis of cancer on imaging studies.\par 3 hld  to lower  LDL and non HDL  cholesterol levels     1 limit your intake of foods full of saturated fats  , trans fats, and dietary cholesterol .  Food with a lot of saturated fate include butter, fatty flesh like red meat, full fat and low fat dairy  products  , palm oil and coconut oil .   If you see partially hydrogenated fat in the ingredient  list of food label that food has trans fats.  Top sources of dietary chol include egg yolks , organ meats and shell fish.  one Type of fat omega 3 Fatty acids has been shown to protect against heart disease  . Good sources are cold water fish like salmon, mackerel , halibut ., trout  herring and sardines.     Limit  your intake of meat , poultry and fish to no more than 3.5  ounces per day     Eat a lot more fiber rich foods  like beans , oats, barley fruits and vegetables   .  Food naturally rich in soluble fiber  are good at lowering cholesterol .    Excellent choices  are  oats , oat bran , barley , peas , yams sweet potatoes and legumes  or beans .  Good fruit sources are berries passion fruit, oranges pears,, apricots , apples  and nectarines  .    choose protein rich plant foods   such as legumes or beans nuts and seeds over meat.     Lose as much weight as possible and exercise   Take plant sterol supplements   .A Daily intake  of 1-2 gms  of plant sterols  lowers ldl .    Best choice is supplements  such as Cholestoff  by natures made   because they don’t have calories  sugar trans fats   an salt  of many foods enriched with plant sterols.    Take  Metamucil or psyllium   .   Studies have shown 9-10 gms as daily of psyllium   the equivalent of  about  3 teaspoons  of sugar free Metamucil   reduced LDL levels  .\par 4.  hematuria   repeat  ua  Hematuria may be a symptom of an underlying disease, some of which are life threatening and some of which are treatable . The causes vary with age, with the most common being inflammation or infection of the prostate or bladder, stones, and, in older patients, a kidney or urinary tract malignancy   \par She  had  sexual realtion  3-4  days ago.  \par \par \par

## 2023-07-13 NOTE — PHYSICAL EXAM
[Normal Sclera/Conjunctiva] : normal sclera/conjunctiva [PERRL] : pupils equal round and reactive to light [EOMI] : extraocular movements intact [Normal Oropharynx] : the oropharynx was normal [No JVD] : no jugular venous distention [Supple] : supple [Normal Rate] : normal rate  [Regular Rhythm] : with a regular rhythm [Normal S1, S2] : normal S1 and S2 [No Edema] : there was no peripheral edema [No Extremity Clubbing/Cyanosis] : no extremity clubbing/cyanosis [Flat] : flat [Soft, Nontender] : the abdomen was soft and nontender [No Mass] : no masses were palpated [No HSM] : no hepatosplenomegaly noted [None] : no CVA tenderness [Normal Posterior Cervical Nodes] : no posterior cervical lymphadenopathy [Normal Anterior Cervical Nodes] : no anterior cervical lymphadenopathy [No CVA Tenderness] : no CVA  tenderness [No Rash] : no rash [No Skin Lesions] : no skin lesions [Normal] : affect was normal and insight and judgment were intact

## 2023-07-13 NOTE — HEALTH RISK ASSESSMENT
[No] : In the past 12 months have you used drugs other than those required for medical reasons? No [No falls in past year] : Patient reported no falls in the past year [0] : 2) Feeling down, depressed, or hopeless: Not at all (0) [PHQ-2 Negative - No further assessment needed] : PHQ-2 Negative - No further assessment needed [Never] : Never [de-identified] : surg   Dr Mccullough [de-identified] : walking   weight s [de-identified] : regular [[id number]] : 0

## 2023-07-13 NOTE — HISTORY OF PRESENT ILLNESS
[FreeTextEntry1] : fu   [de-identified] : pt  has  gb  polyps  and had mri which  showed only one polyp and adenomyomatosis of gb   all other us  showed   multiple polyps  and more recent thickening of wall.  ultrasound is the best initial imaging choice and is often able to separate cholesterol polyps from those requiring treatment. General features of gallbladder polyps are a non-shadowing polypoid ingrowth into the gallbladder lumen, which is usually immobile unless there is a relatively long pedunculated.   She ha had  us  in Dec and will repeat .    she  -denies any headaches, nausea, vomiting, fever, chills, sweats, chest pain, chest pressure, diarrhea, constipation, dysphagia, sour taste in the mouth, dizziness, leg swelling, leg pain, myalgias, arthralgias, itchy eyes, itchy ears, heartburn, or reflux.\par \par \par   she has occ pinch in ruq .

## 2023-07-16 DIAGNOSIS — R31.29 OTHER MICROSCOPIC HEMATURIA: ICD-10-CM

## 2023-07-16 LAB
25(OH)D3 SERPL-MCNC: 62.6 NG/ML
ALBUMIN SERPL ELPH-MCNC: 5 G/DL
ALP BLD-CCNC: 36 U/L
ALT SERPL-CCNC: 17 U/L
ANION GAP SERPL CALC-SCNC: 14 MMOL/L
APPEARANCE: ABNORMAL
AST SERPL-CCNC: 25 U/L
BILIRUB DIRECT SERPL-MCNC: 0.2 MG/DL
BILIRUB INDIRECT SERPL-MCNC: 0.5 MG/DL
BILIRUB SERPL-MCNC: 0.7 MG/DL
BILIRUBIN URINE: NEGATIVE
BLOOD URINE: NEGATIVE
BUN SERPL-MCNC: 19 MG/DL
CALCIUM SERPL-MCNC: 10 MG/DL
CHLORIDE SERPL-SCNC: 102 MMOL/L
CHOLEST SERPL-MCNC: 247 MG/DL
CO2 SERPL-SCNC: 28 MMOL/L
COLOR: YELLOW
CREAT SERPL-MCNC: 0.58 MG/DL
EGFR: 102 ML/MIN/1.73M2
GLUCOSE QUALITATIVE U: NEGATIVE MG/DL
GLUCOSE SERPL-MCNC: 94 MG/DL
HDLC SERPL-MCNC: 59 MG/DL
KETONES URINE: ABNORMAL MG/DL
LDLC SERPL CALC-MCNC: 160 MG/DL
LEUKOCYTE ESTERASE URINE: NEGATIVE
NITRITE URINE: NEGATIVE
NONHDLC SERPL-MCNC: 188 MG/DL
PH URINE: 7
POTASSIUM SERPL-SCNC: 3.9 MMOL/L
PROT SERPL-MCNC: 7.2 G/DL
PROTEIN URINE: NEGATIVE MG/DL
SODIUM SERPL-SCNC: 144 MMOL/L
SPECIFIC GRAVITY URINE: 1.02
TRIGL SERPL-MCNC: 153 MG/DL
UROBILINOGEN URINE: 0.2 MG/DL

## 2023-07-20 ENCOUNTER — OUTPATIENT (OUTPATIENT)
Dept: OUTPATIENT SERVICES | Facility: HOSPITAL | Age: 63
LOS: 1 days | End: 2023-07-20

## 2023-07-20 ENCOUNTER — APPOINTMENT (OUTPATIENT)
Dept: ULTRASOUND IMAGING | Facility: CLINIC | Age: 63
End: 2023-07-20
Payer: COMMERCIAL

## 2023-07-20 PROCEDURE — 76700 US EXAM ABDOM COMPLETE: CPT | Mod: 26

## 2023-12-05 ENCOUNTER — APPOINTMENT (OUTPATIENT)
Dept: INTERNAL MEDICINE | Facility: CLINIC | Age: 63
End: 2023-12-05

## 2024-01-09 ENCOUNTER — APPOINTMENT (OUTPATIENT)
Dept: RHEUMATOLOGY | Facility: CLINIC | Age: 64
End: 2024-01-09
Payer: COMMERCIAL

## 2024-01-09 VITALS
HEART RATE: 82 BPM | BODY MASS INDEX: 18.47 KG/M2 | DIASTOLIC BLOOD PRESSURE: 77 MMHG | SYSTOLIC BLOOD PRESSURE: 136 MMHG | RESPIRATION RATE: 16 BRPM | OXYGEN SATURATION: 97 % | HEIGHT: 58 IN | WEIGHT: 88 LBS | TEMPERATURE: 98.6 F

## 2024-01-09 VITALS — SYSTOLIC BLOOD PRESSURE: 134 MMHG | DIASTOLIC BLOOD PRESSURE: 84 MMHG

## 2024-01-09 PROCEDURE — 96401 CHEMO ANTI-NEOPL SQ/IM: CPT

## 2024-01-09 RX ORDER — DENOSUMAB 60 MG/ML
60 INJECTION SUBCUTANEOUS
Qty: 1 | Refills: 2 | Status: ACTIVE | COMMUNITY
Start: 2021-10-25

## 2024-01-09 RX ORDER — DENOSUMAB 60 MG/ML
60 INJECTION SUBCUTANEOUS
Qty: 1 | Refills: 0 | Status: COMPLETED | OUTPATIENT
Start: 2024-01-09

## 2024-01-09 RX ADMIN — DENOSUMAB 0 MG/ML: 60 INJECTION SUBCUTANEOUS at 00:00

## 2024-06-03 ENCOUNTER — APPOINTMENT (OUTPATIENT)
Dept: INTERNAL MEDICINE | Facility: CLINIC | Age: 64
End: 2024-06-03
Payer: COMMERCIAL

## 2024-06-03 VITALS
SYSTOLIC BLOOD PRESSURE: 133 MMHG | WEIGHT: 89 LBS | OXYGEN SATURATION: 97 % | BODY MASS INDEX: 18.68 KG/M2 | RESPIRATION RATE: 13 BRPM | HEIGHT: 58 IN | HEART RATE: 60 BPM | TEMPERATURE: 97.1 F | DIASTOLIC BLOOD PRESSURE: 81 MMHG

## 2024-06-03 VITALS
TEMPERATURE: 97.1 F | WEIGHT: 89 LBS | DIASTOLIC BLOOD PRESSURE: 81 MMHG | SYSTOLIC BLOOD PRESSURE: 133 MMHG | OXYGEN SATURATION: 97 % | HEIGHT: 58 IN | BODY MASS INDEX: 18.68 KG/M2 | RESPIRATION RATE: 18 BRPM | HEART RATE: 60 BPM

## 2024-06-03 DIAGNOSIS — Z12.31 ENCOUNTER FOR SCREENING MAMMOGRAM FOR MALIGNANT NEOPLASM OF BREAST: ICD-10-CM

## 2024-06-03 DIAGNOSIS — R79.89 OTHER SPECIFIED ABNORMAL FINDINGS OF BLOOD CHEMISTRY: ICD-10-CM

## 2024-06-03 DIAGNOSIS — K82.4 CHOLESTEROLOSIS OF GALLBLADDER: ICD-10-CM

## 2024-06-03 DIAGNOSIS — E78.00 PURE HYPERCHOLESTEROLEMIA, UNSPECIFIED: ICD-10-CM

## 2024-06-03 DIAGNOSIS — M81.0 AGE-RELATED OSTEOPOROSIS W/OUT CURRENT PATHOLOGICAL FRACTURE: ICD-10-CM

## 2024-06-03 DIAGNOSIS — E78.2 MIXED HYPERLIPIDEMIA: ICD-10-CM

## 2024-06-03 DIAGNOSIS — R74.8 ABNORMAL LEVELS OF OTHER SERUM ENZYMES: ICD-10-CM

## 2024-06-03 PROCEDURE — 99214 OFFICE O/P EST MOD 30 MIN: CPT

## 2024-06-03 NOTE — COUNSELING
[Sleep ___ hours/day] : Sleep [unfilled] hours/day [Engage in a relaxing activity] : Engage in a relaxing activity [Plan in advance] : Plan in advance [____ min/wk Activity] : [unfilled] min/wk activity [FreeTextEntry2] : bmi 60

## 2024-06-03 NOTE — HISTORY OF PRESENT ILLNESS
[Spouse] : spouse [FreeTextEntry1] : fu [de-identified] : Pt is feeling well and -denies any headaches, nausea, vomiting, fever, chills, sweats, chest pain, chest pressure, diarrhea, constipation, dysphagia, sour taste in the mouth, dizziness, leg swelling, leg pain, myalgias, arthralgias, itchy eyes, itchy ears, heartburn, or reflux. She is  due for her  mammogram and cpe past due.  Her last eye exam 12/23

## 2024-06-03 NOTE — PHYSICAL EXAM
[No Acute Distress] : no acute distress [Well Nourished] : well nourished [Well Developed] : well developed [Normal Sclera/Conjunctiva] : normal sclera/conjunctiva [PERRL] : pupils equal round and reactive to light [EOMI] : extraocular movements intact [Normal Oropharynx] : the oropharynx was normal [No JVD] : no jugular venous distention [Supple] : supple [Normal Rate] : normal rate  [Regular Rhythm] : with a regular rhythm [Normal S1, S2] : normal S1 and S2 [No Edema] : there was no peripheral edema [No Extremity Clubbing/Cyanosis] : no extremity clubbing/cyanosis [Examination Of The Breasts] : a normal appearance [No Discharge] : no discharge [No Masses] : no breast masses were palpable [Axillary Lymph Nodes Enlarged Bilaterally] : no enlarged nodes [Normal Posterior Cervical Nodes] : no posterior cervical lymphadenopathy [Normal Anterior Cervical Nodes] : no anterior cervical lymphadenopathy [No CVA Tenderness] : no CVA  tenderness [No Spinal Tenderness] : no spinal tenderness [Normal] : affect was normal and insight and judgment were intact

## 2024-06-03 NOTE — HEALTH RISK ASSESSMENT
[No] : In the past 12 months have you used drugs other than those required for medical reasons? No [No falls in past year] : Patient reported no falls in the past year [Little interest or pleasure doing things] : 1) Little interest or pleasure doing things [Feeling down, depressed, or hopeless] : 2) Feeling down, depressed, or hopeless [0] : 2) Feeling down, depressed, or hopeless: Not at all (0) [PHQ-2 Negative - No further assessment needed] : PHQ-2 Negative - No further assessment needed [Never] : Never [de-identified] : home exercises  walking   [de-identified] : healthy  [[id number]] : 0

## 2024-06-03 NOTE — ASSESSMENT
[FreeTextEntry1] : 1 gall bladder   gallbladder polyps Surgery is necessary if polyps cause symptoms or are larger than 1 cm. Doctors also recommend surgery when a polyp has grown by 2 mm or more since the last checkup. True gallbladder polyps are rare, and they can cause gallbladder cancer. The treatment involves surgical removal of the gallbladder Very small polyps, those less than 1 centimeter (or less than 1.5 cm, according to some studies) may not need gallbladder removal surgery, and instead can be regularly monitored by scanning and re-evaluated for any suspicious changes that could indicate gallbladder cancer.   Polyps larger than 1 centimeter in size are more likely to become cancerous, especially those that are 1.5 centimeter across and larger  they have a 46 to 70 percent chance of containing cancer cells.   Monitoring for gallbladder polyps less than 1.5 centimeter should occur every three to six months for up to two years, after which it can be stopped if there have been no changes in the polyps. It isn't recommended that gallbladder polyps smaller than 0.5 centimeter across be treated by having the gallbladder removed. In gallbladder polyps that are that small, the risk of gallbladder cancer is extremely rare.   Gallbladder polyps that appear cancerous can be treated by surgical removal of the gallbladder. For larger gallbladder polyps, cholecystectomy may also be recommended to prevent the development of gallbladder cancer.    Deciding how to treat gallbladder polyps requires using thoughtful balance  weighing the potential risks of surgery against the potential risks of the development of gallbladder cancer. Paying attention to the overall cancer risk and considering careful monitoring of gallbladder polyps can be an effective treatment strategy to preserve your health 2. osteoporosis  -  Make your home safer  To avoid falling at home, get rid of things that might make you trip or slip. This might include furniture, electrical cords, clutter, and loose rugs Keep your home well-lit so that you can easily see where you are going. Avoid storing things in high places so you don't have to reach or climb. ?Wear sturdy shoes that fit well  Wearing shoes with high heels or slippery soles, or shoes that are too loose, can lead to falls. Walking around in bare feet, or only socks, can also increase your risk of falling. ?Take vitamin D pills  Taking vitamin D might lower the risk of falls in older people. This is because vitamin D helps make bones and muscles stronger. Your doctor can talk to you about whether you should take extra vitamin D, and how much. ?Stay active  Exercising on a regular basis can help lower your risk of falling. It might also help prevent you from getting hurt if you do fall. It is best to do a few different activities that help with both strength and balance. There are many kinds of exercise that can be safe for older people. These include walking, swimming, and [place] Chi (a Chinese martial art that involves slow, gentle movements). ?Use a cane, walker, and other safety devices  If your doctor recommends that you use a cane or walker, be sure that it's the right size and you know how to use it. There are other devices that might help you avoid falling, too. These include grab bars or a sturdy seat for the shower, non-slip bath mats, and hand rails or treads for the stairs (to prevent slipping). If you worry that you could fall, there are also alarm buttons that let you call for help if you fall and can't get up. 3. hld  to lower  LDL and non HDL  cholesterol levels     1 limit your intake of foods full of saturated fats  , trans fats, and dietary cholesterol .  Food with a lot of saturated fate include butter, fatty flesh like red meat, full fat and low fat dairy  products  , palm oil and coconut oil .   If you see partially hydrogenated fat in the ingredient  list of food label that food has trans fats.  Top sources of dietary chol include egg yolks , organ meats and shell fish.  one Type of fat omega 3 Fatty acids has been shown to protect against heart disease  . Good sources are cold water fish like salmon, mackerel , halibut ., trout  herring and sardines.     Limit  your intake of meat , poultry and fish to no more than 3.5  ounces per day     Eat a lot more fiber rich foods  like beans , oats, barley fruits and vegetables   .  Food naturally rich in soluble fiber  are good at lowering cholesterol .    Excellent choices  are  oats , oat bran , barley , peas , yams sweet potatoes and legumes  or beans .  Good fruit sources are berries passion fruit, oranges pears,, apricots , apples  and nectarines  .    choose protein rich plant foods   such as legumes or beans nuts and seeds over meat.     Lose as much weight as possible and exercise   Take plant sterol supplements   .A Daily intake  of 1-2 gms  of plant sterols  lowers ldl .    Best choice is supplements  such as Cholestoff  by natures made   because they dont have calories  sugar trans fats   an salt  of many foods enriched with plant sterols.    Take  Metamucil or psyllium   .   Studies have shown 9-10 gms as daily of psyllium   the equivalent of  about  3 teaspoons  of sugar free Metamucil   reduced LDL levels  . 4. elevated liver enzymes   recheck  5 breast cancer screening

## 2024-06-04 LAB
25(OH)D3 SERPL-MCNC: 59.1 NG/ML
ALBUMIN SERPL ELPH-MCNC: 4.7 G/DL
ALP BLD-CCNC: 35 U/L
ALT SERPL-CCNC: 16 U/L
ANION GAP SERPL CALC-SCNC: 17 MMOL/L
APPEARANCE: CLEAR
AST SERPL-CCNC: 19 U/L
BASOPHILS # BLD AUTO: 0.04 K/UL
BASOPHILS NFR BLD AUTO: 0.9 %
BILIRUB SERPL-MCNC: 0.5 MG/DL
BILIRUBIN URINE: NEGATIVE
BLOOD URINE: NEGATIVE
BUN SERPL-MCNC: 14 MG/DL
CALCIUM SERPL-MCNC: 9.9 MG/DL
CHLORIDE SERPL-SCNC: 101 MMOL/L
CHOLEST SERPL-MCNC: 250 MG/DL
CO2 SERPL-SCNC: 24 MMOL/L
COLOR: YELLOW
CREAT SERPL-MCNC: 0.62 MG/DL
EGFR: 99 ML/MIN/1.73M2
EOSINOPHIL # BLD AUTO: 0.05 K/UL
EOSINOPHIL NFR BLD AUTO: 1.1 %
GLUCOSE QUALITATIVE U: NEGATIVE MG/DL
GLUCOSE SERPL-MCNC: 93 MG/DL
HCT VFR BLD CALC: 39.1 %
HDLC SERPL-MCNC: 55 MG/DL
HGB BLD-MCNC: 12.7 G/DL
IMM GRANULOCYTES NFR BLD AUTO: 0.2 %
KETONES URINE: NEGATIVE MG/DL
LDLC SERPL CALC-MCNC: 173 MG/DL
LEUKOCYTE ESTERASE URINE: NEGATIVE
LYMPHOCYTES # BLD AUTO: 1.48 K/UL
LYMPHOCYTES NFR BLD AUTO: 32 %
MAN DIFF?: NORMAL
MCHC RBC-ENTMCNC: 29.8 PG
MCHC RBC-ENTMCNC: 32.5 GM/DL
MCV RBC AUTO: 91.8 FL
MONOCYTES # BLD AUTO: 0.4 K/UL
MONOCYTES NFR BLD AUTO: 8.6 %
NEUTROPHILS # BLD AUTO: 2.65 K/UL
NEUTROPHILS NFR BLD AUTO: 57.2 %
NITRITE URINE: NEGATIVE
NONHDLC SERPL-MCNC: 195 MG/DL
PH URINE: 7.5
PLATELET # BLD AUTO: 302 K/UL
POTASSIUM SERPL-SCNC: 4.3 MMOL/L
PROT SERPL-MCNC: 6.8 G/DL
PROTEIN URINE: NEGATIVE MG/DL
RBC # BLD: 4.26 M/UL
RBC # FLD: 14.1 %
SODIUM SERPL-SCNC: 142 MMOL/L
SPECIFIC GRAVITY URINE: 1.01
TRIGL SERPL-MCNC: 122 MG/DL
UROBILINOGEN URINE: 0.2 MG/DL
WBC # FLD AUTO: 4.63 K/UL

## 2024-06-04 RX ORDER — CHOLECALCIFEROL (VITAMIN D3) 25 MCG
450 TABLET,CHEWABLE ORAL
Qty: 90 | Refills: 3 | Status: ACTIVE | COMMUNITY
Start: 2024-06-04 | End: 1900-01-01

## 2024-06-27 ENCOUNTER — RESULT REVIEW (OUTPATIENT)
Age: 64
End: 2024-06-27

## 2024-06-27 ENCOUNTER — APPOINTMENT (OUTPATIENT)
Dept: ULTRASOUND IMAGING | Facility: CLINIC | Age: 64
End: 2024-06-27
Payer: COMMERCIAL

## 2024-06-27 ENCOUNTER — APPOINTMENT (OUTPATIENT)
Dept: MAMMOGRAPHY | Facility: CLINIC | Age: 64
End: 2024-06-27
Payer: COMMERCIAL

## 2024-06-27 PROCEDURE — 77067 SCR MAMMO BI INCL CAD: CPT

## 2024-06-27 PROCEDURE — 76641 ULTRASOUND BREAST COMPLETE: CPT | Mod: 50,TC

## 2024-06-27 PROCEDURE — 77063 BREAST TOMOSYNTHESIS BI: CPT

## 2024-06-27 PROCEDURE — 76641 ULTRASOUND BREAST COMPLETE: CPT | Mod: 26,50

## 2024-06-27 PROCEDURE — 76700 US EXAM ABDOM COMPLETE: CPT

## 2024-07-25 ENCOUNTER — APPOINTMENT (OUTPATIENT)
Dept: RHEUMATOLOGY | Facility: CLINIC | Age: 64
End: 2024-07-25
Payer: COMMERCIAL

## 2024-07-25 VITALS — SYSTOLIC BLOOD PRESSURE: 151 MMHG | DIASTOLIC BLOOD PRESSURE: 82 MMHG

## 2024-07-25 VITALS
WEIGHT: 86 LBS | TEMPERATURE: 97.7 F | OXYGEN SATURATION: 98 % | BODY MASS INDEX: 18.05 KG/M2 | HEIGHT: 58 IN | SYSTOLIC BLOOD PRESSURE: 147 MMHG | RESPIRATION RATE: 16 BRPM | HEART RATE: 70 BPM | DIASTOLIC BLOOD PRESSURE: 85 MMHG

## 2024-07-25 DIAGNOSIS — M81.0 AGE-RELATED OSTEOPOROSIS W/OUT CURRENT PATHOLOGICAL FRACTURE: ICD-10-CM

## 2024-07-25 PROCEDURE — 96401 CHEMO ANTI-NEOPL SQ/IM: CPT

## 2024-07-25 RX ORDER — DENOSUMAB 60 MG/ML
60 INJECTION SUBCUTANEOUS
Qty: 1 | Refills: 0 | Status: COMPLETED | OUTPATIENT
Start: 2024-07-25

## 2024-07-25 RX ADMIN — DENOSUMAB 0 MG/ML: 60 INJECTION SUBCUTANEOUS at 00:00

## 2024-07-25 NOTE — ASSESSMENT
[FreeTextEntry1] : Patient with OP: Denosumab injection given.  Discussed the side effects including myalgias and hypocalcemia that may arise from injection.  Blood tests scheduled at PCP in the next two months.  She will continue on Calcium and VitD at the recommended doses of 1200mg and 800IU daily, respectively.  We reviewed calcium and VitD enriched foods as well. She is in agreement with the above plan and will return in six months' time.

## 2024-11-12 ENCOUNTER — APPOINTMENT (OUTPATIENT)
Dept: INTERNAL MEDICINE | Facility: CLINIC | Age: 64
End: 2024-11-12

## 2024-12-06 ENCOUNTER — NON-APPOINTMENT (OUTPATIENT)
Age: 64
End: 2024-12-06

## 2024-12-12 ENCOUNTER — RX RENEWAL (OUTPATIENT)
Age: 64
End: 2024-12-12

## 2025-01-09 ENCOUNTER — APPOINTMENT (OUTPATIENT)
Dept: RADIOLOGY | Facility: CLINIC | Age: 65
End: 2025-01-09
Payer: COMMERCIAL

## 2025-01-09 PROCEDURE — 77080 DXA BONE DENSITY AXIAL: CPT

## 2025-02-06 ENCOUNTER — APPOINTMENT (OUTPATIENT)
Dept: RHEUMATOLOGY | Facility: CLINIC | Age: 65
End: 2025-02-06
Payer: COMMERCIAL

## 2025-02-06 ENCOUNTER — NON-APPOINTMENT (OUTPATIENT)
Age: 65
End: 2025-02-06

## 2025-02-06 VITALS — SYSTOLIC BLOOD PRESSURE: 134 MMHG | DIASTOLIC BLOOD PRESSURE: 85 MMHG

## 2025-02-06 VITALS
BODY MASS INDEX: 18.68 KG/M2 | TEMPERATURE: 98.7 F | HEART RATE: 66 BPM | DIASTOLIC BLOOD PRESSURE: 84 MMHG | SYSTOLIC BLOOD PRESSURE: 149 MMHG | WEIGHT: 89 LBS | HEIGHT: 58 IN | RESPIRATION RATE: 16 BRPM | OXYGEN SATURATION: 98 %

## 2025-02-06 DIAGNOSIS — M81.0 AGE-RELATED OSTEOPOROSIS W/OUT CURRENT PATHOLOGICAL FRACTURE: ICD-10-CM

## 2025-02-06 PROCEDURE — 96401 CHEMO ANTI-NEOPL SQ/IM: CPT

## 2025-02-06 RX ADMIN — DENOSUMAB 0 MG/ML: 60 INJECTION SUBCUTANEOUS at 00:00

## 2025-02-10 RX ORDER — DENOSUMAB 60 MG/ML
60 INJECTION SUBCUTANEOUS
Qty: 1 | Refills: 0 | Status: COMPLETED | OUTPATIENT
Start: 2025-02-06

## 2025-03-24 ENCOUNTER — TRANSCRIPTION ENCOUNTER (OUTPATIENT)
Age: 65
End: 2025-03-24

## 2025-05-25 ENCOUNTER — NON-APPOINTMENT (OUTPATIENT)
Age: 65
End: 2025-05-25

## 2025-05-27 ENCOUNTER — APPOINTMENT (OUTPATIENT)
Dept: PHYSICAL MEDICINE AND REHAB | Facility: CLINIC | Age: 65
End: 2025-05-27
Payer: COMMERCIAL

## 2025-05-27 DIAGNOSIS — M54.2 CERVICALGIA: ICD-10-CM

## 2025-05-27 DIAGNOSIS — G89.29 CERVICALGIA: ICD-10-CM

## 2025-05-27 DIAGNOSIS — M47.812 SPONDYLOSIS W/OUT MYELOPATHY OR RADICULOPATHY, CERVICAL REGION: ICD-10-CM

## 2025-05-27 DIAGNOSIS — M50.30 OTHER CERVICAL DISC DEGENERATION, UNSPECIFIED CERVICAL REGION: ICD-10-CM

## 2025-05-27 PROCEDURE — 99203 OFFICE O/P NEW LOW 30 MIN: CPT

## 2025-06-04 ENCOUNTER — APPOINTMENT (OUTPATIENT)
Dept: MRI IMAGING | Facility: CLINIC | Age: 65
End: 2025-06-04
Payer: COMMERCIAL

## 2025-06-04 PROCEDURE — 72141 MRI NECK SPINE W/O DYE: CPT

## 2025-07-02 ENCOUNTER — APPOINTMENT (OUTPATIENT)
Dept: PHYSICAL MEDICINE AND REHAB | Facility: CLINIC | Age: 65
End: 2025-07-02
Payer: COMMERCIAL

## 2025-07-02 PROCEDURE — 99214 OFFICE O/P EST MOD 30 MIN: CPT

## 2025-07-02 RX ORDER — CELECOXIB 200 MG/1
200 CAPSULE ORAL
Qty: 60 | Refills: 2 | Status: ACTIVE | COMMUNITY
Start: 2025-07-02 | End: 1900-01-01

## 2025-07-15 ENCOUNTER — LABORATORY RESULT (OUTPATIENT)
Age: 65
End: 2025-07-15

## 2025-07-15 ENCOUNTER — APPOINTMENT (OUTPATIENT)
Dept: INTERNAL MEDICINE | Facility: CLINIC | Age: 65
End: 2025-07-15
Payer: COMMERCIAL

## 2025-07-15 VITALS
SYSTOLIC BLOOD PRESSURE: 126 MMHG | HEART RATE: 66 BPM | DIASTOLIC BLOOD PRESSURE: 63 MMHG | WEIGHT: 92.06 LBS | OXYGEN SATURATION: 97 % | TEMPERATURE: 97.2 F | HEIGHT: 58 IN | BODY MASS INDEX: 19.33 KG/M2

## 2025-07-15 PROCEDURE — 99397 PER PM REEVAL EST PAT 65+ YR: CPT

## 2025-07-15 PROCEDURE — 93000 ELECTROCARDIOGRAM COMPLETE: CPT

## 2025-07-15 RX ORDER — PNEUMOCOCCAL 20-VALENT CONJUGATE VACCINE 2.2; 2.2; 2.2; 2.2; 2.2; 2.2; 2.2; 2.2; 2.2; 2.2; 2.2; 2.2; 2.2; 2.2; 2.2; 2.2; 4.4; 2.2; 2.2; 2.2 UG/.5ML; UG/.5ML; UG/.5ML; UG/.5ML; UG/.5ML; UG/.5ML; UG/.5ML; UG/.5ML; UG/.5ML; UG/.5ML; UG/.5ML; UG/.5ML; UG/.5ML; UG/.5ML; UG/.5ML; UG/.5ML; UG/.5ML; UG/.5ML; UG/.5ML; UG/.5ML
INJECTION, SUSPENSION INTRAMUSCULAR
Qty: 1 | Refills: 0 | Status: ACTIVE | COMMUNITY
Start: 2025-07-15 | End: 1900-01-01

## 2025-07-16 LAB
25(OH)D3 SERPL-MCNC: 54.8 NG/ML
ALBUMIN SERPL ELPH-MCNC: 4.5 G/DL
ALP BLD-CCNC: 37 U/L
ALT SERPL-CCNC: 20 U/L
ANION GAP SERPL CALC-SCNC: 15 MMOL/L
APPEARANCE: ABNORMAL
AST SERPL-CCNC: 28 U/L
BASOPHILS # BLD AUTO: 0.05 K/UL
BASOPHILS NFR BLD AUTO: 1.3 %
BILIRUB SERPL-MCNC: 0.3 MG/DL
BILIRUBIN URINE: NEGATIVE
BLOOD URINE: NEGATIVE
BUN SERPL-MCNC: 20 MG/DL
CALCIUM SERPL-MCNC: 9.7 MG/DL
CHLORIDE SERPL-SCNC: 104 MMOL/L
CHOLEST SERPL-MCNC: 230 MG/DL
CO2 SERPL-SCNC: 23 MMOL/L
COLOR: YELLOW
CREAT SERPL-MCNC: 0.56 MG/DL
CRP SERPL-MCNC: <3 MG/L
EGFRCR SERPLBLD CKD-EPI 2021: 101 ML/MIN/1.73M2
EOSINOPHIL # BLD AUTO: 0.15 K/UL
EOSINOPHIL NFR BLD AUTO: 3.9 %
ERYTHROCYTE [SEDIMENTATION RATE] IN BLOOD BY WESTERGREN METHOD: 20 MM/HR
FERRITIN SERPL-MCNC: 273 NG/ML
GLUCOSE QUALITATIVE U: NEGATIVE MG/DL
GLUCOSE SERPL-MCNC: 94 MG/DL
HCT VFR BLD CALC: 37.2 %
HCV AB SER QL: NONREACTIVE
HCV S/CO RATIO: 0.09 S/CO
HDLC SERPL-MCNC: 54 MG/DL
HGB BLD-MCNC: 11.9 G/DL
HIV1+2 AB SPEC QL IA.RAPID: NONREACTIVE
IMM GRANULOCYTES NFR BLD AUTO: 0.3 %
IRON SATN MFR SERPL: 31 %
IRON SERPL-MCNC: 94 UG/DL
KETONES URINE: NEGATIVE MG/DL
LDLC SERPL-MCNC: 158 MG/DL
LEUKOCYTE ESTERASE URINE: NEGATIVE
LYMPHOCYTES # BLD AUTO: 1.28 K/UL
LYMPHOCYTES NFR BLD AUTO: 33.6 %
MAN DIFF?: NORMAL
MCHC RBC-ENTMCNC: 30.1 PG
MCHC RBC-ENTMCNC: 32 G/DL
MCV RBC AUTO: 93.9 FL
MONOCYTES # BLD AUTO: 0.32 K/UL
MONOCYTES NFR BLD AUTO: 8.4 %
NEUTROPHILS # BLD AUTO: 2 K/UL
NEUTROPHILS NFR BLD AUTO: 52.5 %
NITRITE URINE: NEGATIVE
NONHDLC SERPL-MCNC: 176 MG/DL
PH URINE: 7
PLATELET # BLD AUTO: 251 K/UL
POTASSIUM SERPL-SCNC: 4.5 MMOL/L
PROT SERPL-MCNC: 6.7 G/DL
PROTEIN URINE: NEGATIVE MG/DL
RBC # BLD: 3.96 M/UL
RBC # FLD: 14.1 %
SODIUM SERPL-SCNC: 142 MMOL/L
SPECIFIC GRAVITY URINE: 1.02
TIBC SERPL-MCNC: 300 UG/DL
TRIGL SERPL-MCNC: 103 MG/DL
TSH SERPL-ACNC: 1.33 UIU/ML
UIBC SERPL-MCNC: 206 UG/DL
UROBILINOGEN URINE: 0.2 MG/DL
WBC # FLD AUTO: 3.81 K/UL

## 2025-07-20 LAB — ANA TITR SER: NEGATIVE
